# Patient Record
Sex: FEMALE | Race: ASIAN | NOT HISPANIC OR LATINO | URBAN - METROPOLITAN AREA
[De-identification: names, ages, dates, MRNs, and addresses within clinical notes are randomized per-mention and may not be internally consistent; named-entity substitution may affect disease eponyms.]

---

## 2018-03-29 ENCOUNTER — COMMUNICATION - HEALTHEAST (OUTPATIENT)
Dept: FAMILY MEDICINE | Facility: CLINIC | Age: 68
End: 2018-03-29

## 2018-03-29 ENCOUNTER — OFFICE VISIT - HEALTHEAST (OUTPATIENT)
Dept: FAMILY MEDICINE | Facility: CLINIC | Age: 68
End: 2018-03-29

## 2018-03-29 DIAGNOSIS — E03.9 HYPOTHYROIDISM: ICD-10-CM

## 2018-03-29 DIAGNOSIS — I10 ESSENTIAL HYPERTENSION: ICD-10-CM

## 2018-03-29 DIAGNOSIS — K91.2 POSTSURGICAL MALABSORPTION, NOT ELSEWHERE CLASSIFIED (CODE): ICD-10-CM

## 2018-03-29 DIAGNOSIS — Z12.11 SCREEN FOR COLON CANCER: ICD-10-CM

## 2018-03-29 DIAGNOSIS — H60.91 RIGHT OTITIS EXTERNA: ICD-10-CM

## 2018-03-29 DIAGNOSIS — Z12.31 VISIT FOR SCREENING MAMMOGRAM: ICD-10-CM

## 2018-03-29 LAB
ALBUMIN SERPL-MCNC: 3.7 G/DL (ref 3.5–5)
ALBUMIN UR-MCNC: NEGATIVE MG/DL
ALP SERPL-CCNC: 86 U/L (ref 45–120)
ALT SERPL W P-5'-P-CCNC: 47 U/L (ref 0–45)
ANION GAP SERPL CALCULATED.3IONS-SCNC: 9 MMOL/L (ref 5–18)
APPEARANCE UR: CLEAR
AST SERPL W P-5'-P-CCNC: 34 U/L (ref 0–40)
BACTERIA #/AREA URNS HPF: ABNORMAL HPF
BILIRUB SERPL-MCNC: 0.6 MG/DL (ref 0–1)
BILIRUB UR QL STRIP: NEGATIVE
BUN SERPL-MCNC: 14 MG/DL (ref 8–22)
CALCIUM SERPL-MCNC: 9.4 MG/DL (ref 8.5–10.5)
CHLORIDE BLD-SCNC: 102 MMOL/L (ref 98–107)
CHOLEST SERPL-MCNC: 249 MG/DL
CO2 SERPL-SCNC: 30 MMOL/L (ref 22–31)
COLOR UR AUTO: YELLOW
CREAT SERPL-MCNC: 0.81 MG/DL (ref 0.6–1.1)
CREAT UR-MCNC: 42.5 MG/DL
FASTING STATUS PATIENT QL REPORTED: YES
GFR SERPL CREATININE-BSD FRML MDRD: >60 ML/MIN/1.73M2
GLUCOSE BLD-MCNC: 101 MG/DL (ref 70–125)
GLUCOSE UR STRIP-MCNC: NEGATIVE MG/DL
HBA1C MFR BLD: 5.8 % (ref 3.5–6)
HDLC SERPL-MCNC: 66 MG/DL
HGB UR QL STRIP: ABNORMAL
KETONES UR STRIP-MCNC: NEGATIVE MG/DL
LDLC SERPL CALC-MCNC: 168 MG/DL
LEUKOCYTE ESTERASE UR QL STRIP: NEGATIVE
MICROALBUMIN UR-MCNC: 0.55 MG/DL (ref 0–1.99)
MICROALBUMIN/CREAT UR: 12.9 MG/G
NITRATE UR QL: NEGATIVE
PH UR STRIP: 7 [PH] (ref 5–8)
POTASSIUM BLD-SCNC: 4.5 MMOL/L (ref 3.5–5)
PROT SERPL-MCNC: 7.2 G/DL (ref 6–8)
RBC #/AREA URNS AUTO: ABNORMAL HPF
SODIUM SERPL-SCNC: 141 MMOL/L (ref 136–145)
SP GR UR STRIP: 1.01 (ref 1–1.03)
SQUAMOUS #/AREA URNS AUTO: ABNORMAL LPF
T3 SERPL-MCNC: 77 NG/DL (ref 45–175)
T4 FREE SERPL-MCNC: 0.9 NG/DL (ref 0.7–1.8)
THYROID PEROXIDASE ANTIBODIES - HISTORICAL: <3 IU/ML (ref 0–5.6)
TRIGL SERPL-MCNC: 76 MG/DL
TSH SERPL DL<=0.005 MIU/L-ACNC: 5.12 UIU/ML (ref 0.3–5)
UROBILINOGEN UR STRIP-ACNC: ABNORMAL
WBC #/AREA URNS AUTO: ABNORMAL HPF

## 2018-03-29 ASSESSMENT — MIFFLIN-ST. JEOR: SCORE: 1172.25

## 2018-04-02 ENCOUNTER — COMMUNICATION - HEALTHEAST (OUTPATIENT)
Dept: FAMILY MEDICINE | Facility: CLINIC | Age: 68
End: 2018-04-02

## 2018-04-02 DIAGNOSIS — E03.9 HYPOTHYROIDISM: ICD-10-CM

## 2018-04-02 DIAGNOSIS — H60.91 RIGHT OTITIS EXTERNA: ICD-10-CM

## 2018-04-02 DIAGNOSIS — I10 ESSENTIAL HYPERTENSION: ICD-10-CM

## 2018-04-04 ENCOUNTER — COMMUNICATION - HEALTHEAST (OUTPATIENT)
Dept: FAMILY MEDICINE | Facility: CLINIC | Age: 68
End: 2018-04-04

## 2018-04-13 ENCOUNTER — COMMUNICATION - HEALTHEAST (OUTPATIENT)
Dept: FAMILY MEDICINE | Facility: CLINIC | Age: 68
End: 2018-04-13

## 2018-10-15 ENCOUNTER — OFFICE VISIT - HEALTHEAST (OUTPATIENT)
Dept: FAMILY MEDICINE | Facility: CLINIC | Age: 68
End: 2018-10-15

## 2018-10-15 ENCOUNTER — RECORDS - HEALTHEAST (OUTPATIENT)
Dept: GENERAL RADIOLOGY | Facility: CLINIC | Age: 68
End: 2018-10-15

## 2018-10-15 ENCOUNTER — COMMUNICATION - HEALTHEAST (OUTPATIENT)
Dept: FAMILY MEDICINE | Facility: CLINIC | Age: 68
End: 2018-10-15

## 2018-10-15 DIAGNOSIS — Z13.228 SCREENING FOR ENDOCRINE, NUTRITIONAL, METABOLIC AND IMMUNITY DISORDER: ICD-10-CM

## 2018-10-15 DIAGNOSIS — M19.011 DJD OF RIGHT SHOULDER: ICD-10-CM

## 2018-10-15 DIAGNOSIS — I10 ESSENTIAL HYPERTENSION: ICD-10-CM

## 2018-10-15 DIAGNOSIS — Z00.01 ENCOUNTER FOR GENERAL ADULT MEDICAL EXAMINATION WITH ABNORMAL FINDINGS: ICD-10-CM

## 2018-10-15 DIAGNOSIS — Z12.11 SCREEN FOR COLON CANCER: ICD-10-CM

## 2018-10-15 DIAGNOSIS — Z13.21 SCREENING FOR ENDOCRINE, NUTRITIONAL, METABOLIC AND IMMUNITY DISORDER: ICD-10-CM

## 2018-10-15 DIAGNOSIS — M25.511 PAIN IN RIGHT SHOULDER: ICD-10-CM

## 2018-10-15 DIAGNOSIS — E03.9 HYPOTHYROIDISM: ICD-10-CM

## 2018-10-15 DIAGNOSIS — M25.511 RIGHT SHOULDER PAIN: ICD-10-CM

## 2018-10-15 DIAGNOSIS — E66.3 OVERWEIGHT (BMI 25.0-29.9): ICD-10-CM

## 2018-10-15 DIAGNOSIS — Z13.29 SCREENING FOR ENDOCRINE, NUTRITIONAL, METABOLIC AND IMMUNITY DISORDER: ICD-10-CM

## 2018-10-15 DIAGNOSIS — Z13.0 SCREENING FOR ENDOCRINE, NUTRITIONAL, METABOLIC AND IMMUNITY DISORDER: ICD-10-CM

## 2018-10-15 DIAGNOSIS — R10.31 RIGHT GROIN PAIN: ICD-10-CM

## 2018-10-15 LAB
ALBUMIN SERPL-MCNC: 4 G/DL (ref 3.5–5)
ALBUMIN UR-MCNC: NEGATIVE MG/DL
ALP SERPL-CCNC: 75 U/L (ref 45–120)
ALT SERPL W P-5'-P-CCNC: 18 U/L (ref 0–45)
ANION GAP SERPL CALCULATED.3IONS-SCNC: 8 MMOL/L (ref 5–18)
APPEARANCE UR: CLEAR
AST SERPL W P-5'-P-CCNC: 19 U/L (ref 0–40)
BILIRUB SERPL-MCNC: 0.5 MG/DL (ref 0–1)
BILIRUB UR QL STRIP: NEGATIVE
BUN SERPL-MCNC: 15 MG/DL (ref 8–22)
CALCIUM SERPL-MCNC: 9.7 MG/DL (ref 8.5–10.5)
CHLORIDE BLD-SCNC: 102 MMOL/L (ref 98–107)
CHOLEST SERPL-MCNC: 239 MG/DL
CO2 SERPL-SCNC: 31 MMOL/L (ref 22–31)
COLOR UR AUTO: YELLOW
CREAT SERPL-MCNC: 0.75 MG/DL (ref 0.6–1.1)
FASTING STATUS PATIENT QL REPORTED: YES
GFR SERPL CREATININE-BSD FRML MDRD: >60 ML/MIN/1.73M2
GLUCOSE BLD-MCNC: 84 MG/DL (ref 70–125)
GLUCOSE UR STRIP-MCNC: NEGATIVE MG/DL
HBA1C MFR BLD: 6.1 % (ref 3.5–6)
HDLC SERPL-MCNC: 62 MG/DL
HGB UR QL STRIP: NEGATIVE
KETONES UR STRIP-MCNC: NEGATIVE MG/DL
LDLC SERPL CALC-MCNC: 149 MG/DL
LEUKOCYTE ESTERASE UR QL STRIP: NEGATIVE
NITRATE UR QL: NEGATIVE
PH UR STRIP: 7.5 [PH] (ref 5–8)
POTASSIUM BLD-SCNC: 4.4 MMOL/L (ref 3.5–5)
PROT SERPL-MCNC: 7.1 G/DL (ref 6–8)
SODIUM SERPL-SCNC: 141 MMOL/L (ref 136–145)
SP GR UR STRIP: 1.01 (ref 1–1.03)
T3 SERPL-MCNC: 72 NG/DL (ref 45–175)
T4 FREE SERPL-MCNC: 0.9 NG/DL (ref 0.7–1.8)
TRIGL SERPL-MCNC: 139 MG/DL
TSH SERPL DL<=0.005 MIU/L-ACNC: 2.24 UIU/ML (ref 0.3–5)
UROBILINOGEN UR STRIP-ACNC: NORMAL

## 2018-10-15 ASSESSMENT — MIFFLIN-ST. JEOR: SCORE: 1163.18

## 2018-11-08 ENCOUNTER — OFFICE VISIT - HEALTHEAST (OUTPATIENT)
Dept: PHYSICAL THERAPY | Facility: REHABILITATION | Age: 68
End: 2018-11-08

## 2018-11-08 ENCOUNTER — AMBULATORY - HEALTHEAST (OUTPATIENT)
Dept: FAMILY MEDICINE | Facility: CLINIC | Age: 68
End: 2018-11-08

## 2018-11-08 DIAGNOSIS — M25.511 RIGHT SHOULDER PAIN: ICD-10-CM

## 2018-11-08 DIAGNOSIS — M25.511 CHRONIC RIGHT SHOULDER PAIN: ICD-10-CM

## 2018-11-08 DIAGNOSIS — M25.611 DECREASED ROM OF RIGHT SHOULDER: ICD-10-CM

## 2018-11-08 DIAGNOSIS — M62.81 MUSCLE WEAKNESS (GENERALIZED): ICD-10-CM

## 2018-11-08 DIAGNOSIS — R29.3 POOR POSTURE: ICD-10-CM

## 2018-11-08 DIAGNOSIS — R10.31 GROIN PAIN, RIGHT: ICD-10-CM

## 2018-11-08 DIAGNOSIS — G89.29 CHRONIC RIGHT SHOULDER PAIN: ICD-10-CM

## 2018-11-13 ENCOUNTER — OFFICE VISIT - HEALTHEAST (OUTPATIENT)
Dept: PHYSICAL THERAPY | Facility: REHABILITATION | Age: 68
End: 2018-11-13

## 2018-11-13 DIAGNOSIS — R10.31 GROIN PAIN, RIGHT: ICD-10-CM

## 2018-11-13 DIAGNOSIS — R29.3 POOR POSTURE: ICD-10-CM

## 2018-11-13 DIAGNOSIS — G89.29 CHRONIC RIGHT SHOULDER PAIN: ICD-10-CM

## 2018-11-13 DIAGNOSIS — M25.611 DECREASED ROM OF RIGHT SHOULDER: ICD-10-CM

## 2018-11-13 DIAGNOSIS — M25.511 CHRONIC RIGHT SHOULDER PAIN: ICD-10-CM

## 2018-11-13 DIAGNOSIS — M62.81 MUSCLE WEAKNESS (GENERALIZED): ICD-10-CM

## 2018-12-12 ENCOUNTER — COMMUNICATION - HEALTHEAST (OUTPATIENT)
Dept: FAMILY MEDICINE | Facility: CLINIC | Age: 68
End: 2018-12-12

## 2018-12-12 DIAGNOSIS — E03.9 HYPOTHYROIDISM: ICD-10-CM

## 2018-12-12 DIAGNOSIS — I10 ESSENTIAL HYPERTENSION: ICD-10-CM

## 2019-08-30 ENCOUNTER — COMMUNICATION - HEALTHEAST (OUTPATIENT)
Dept: SCHEDULING | Facility: CLINIC | Age: 69
End: 2019-08-30

## 2019-09-03 ENCOUNTER — HOSPITAL ENCOUNTER (OUTPATIENT)
Dept: CARDIOLOGY | Facility: CLINIC | Age: 69
Discharge: HOME | End: 2019-09-03
Attending: INTERNAL MEDICINE

## 2019-09-03 DIAGNOSIS — I63.9 ACUTE CVA (CEREBROVASCULAR ACCIDENT) (H): ICD-10-CM

## 2019-09-17 ENCOUNTER — HOSPITAL ENCOUNTER (OUTPATIENT)
Dept: CARDIOLOGY | Facility: CLINIC | Age: 69
Discharge: HOME | End: 2019-09-17
Attending: INTERNAL MEDICINE

## 2019-10-23 ENCOUNTER — COMMUNICATION - HEALTHEAST (OUTPATIENT)
Dept: FAMILY MEDICINE | Facility: CLINIC | Age: 69
End: 2019-10-23

## 2019-10-30 ENCOUNTER — COMMUNICATION - HEALTHEAST (OUTPATIENT)
Dept: FAMILY MEDICINE | Facility: CLINIC | Age: 69
End: 2019-10-30

## 2020-01-04 ENCOUNTER — COMMUNICATION - HEALTHEAST (OUTPATIENT)
Dept: FAMILY MEDICINE | Facility: CLINIC | Age: 70
End: 2020-01-04

## 2020-01-04 DIAGNOSIS — I10 ESSENTIAL HYPERTENSION: ICD-10-CM

## 2020-01-04 DIAGNOSIS — E03.9 HYPOTHYROIDISM: ICD-10-CM

## 2020-01-07 ENCOUNTER — COMMUNICATION - HEALTHEAST (OUTPATIENT)
Dept: FAMILY MEDICINE | Facility: CLINIC | Age: 70
End: 2020-01-07

## 2020-01-07 DIAGNOSIS — I10 ESSENTIAL HYPERTENSION: ICD-10-CM

## 2020-01-21 ENCOUNTER — OFFICE VISIT - HEALTHEAST (OUTPATIENT)
Dept: FAMILY MEDICINE | Facility: CLINIC | Age: 70
End: 2020-01-21

## 2020-01-21 DIAGNOSIS — I69.354 HEMIPLEGIA OF LEFT NONDOMINANT SIDE AS LATE EFFECT OF CEREBRAL INFARCTION, UNSPECIFIED HEMIPLEGIA TYPE (H): ICD-10-CM

## 2020-01-21 DIAGNOSIS — Z12.11 SCREEN FOR COLON CANCER: ICD-10-CM

## 2020-01-21 DIAGNOSIS — E03.8 OTHER SPECIFIED HYPOTHYROIDISM: ICD-10-CM

## 2020-01-21 DIAGNOSIS — Z13.29 SCREENING FOR ENDOCRINE, NUTRITIONAL, METABOLIC AND IMMUNITY DISORDER: ICD-10-CM

## 2020-01-21 DIAGNOSIS — E03.9 HYPOTHYROIDISM, UNSPECIFIED TYPE: ICD-10-CM

## 2020-01-21 DIAGNOSIS — Z13.21 SCREENING FOR ENDOCRINE, NUTRITIONAL, METABOLIC AND IMMUNITY DISORDER: ICD-10-CM

## 2020-01-21 DIAGNOSIS — Z13.0 SCREENING FOR ENDOCRINE, NUTRITIONAL, METABOLIC AND IMMUNITY DISORDER: ICD-10-CM

## 2020-01-21 DIAGNOSIS — E78.2 MIXED HYPERLIPIDEMIA: ICD-10-CM

## 2020-01-21 DIAGNOSIS — Z12.31 VISIT FOR SCREENING MAMMOGRAM: ICD-10-CM

## 2020-01-21 DIAGNOSIS — R94.31 ABNORMAL HOLTER MONITOR FINDING: ICD-10-CM

## 2020-01-21 DIAGNOSIS — I10 ESSENTIAL HYPERTENSION: ICD-10-CM

## 2020-01-21 DIAGNOSIS — I63.9 ACUTE CVA (CEREBROVASCULAR ACCIDENT) (H): ICD-10-CM

## 2020-01-21 DIAGNOSIS — Z13.228 SCREENING FOR ENDOCRINE, NUTRITIONAL, METABOLIC AND IMMUNITY DISORDER: ICD-10-CM

## 2020-01-21 DIAGNOSIS — Z11.4 SCREENING FOR HIV WITHOUT PRESENCE OF RISK FACTORS: ICD-10-CM

## 2020-01-21 LAB
ALBUMIN SERPL-MCNC: 3.8 G/DL (ref 3.5–5)
ALP SERPL-CCNC: 72 U/L (ref 45–120)
ALT SERPL W P-5'-P-CCNC: 21 U/L (ref 0–45)
ANION GAP SERPL CALCULATED.3IONS-SCNC: 8 MMOL/L (ref 5–18)
AST SERPL W P-5'-P-CCNC: 21 U/L (ref 0–40)
BILIRUB SERPL-MCNC: 0.5 MG/DL (ref 0–1)
BUN SERPL-MCNC: 11 MG/DL (ref 8–22)
CALCIUM SERPL-MCNC: 8.9 MG/DL (ref 8.5–10.5)
CHLORIDE BLD-SCNC: 103 MMOL/L (ref 98–107)
CHOLEST SERPL-MCNC: 231 MG/DL
CO2 SERPL-SCNC: 31 MMOL/L (ref 22–31)
CREAT SERPL-MCNC: 0.79 MG/DL (ref 0.6–1.1)
CREAT UR-MCNC: 34.7 MG/DL
ERYTHROCYTE [DISTWIDTH] IN BLOOD BY AUTOMATED COUNT: 11.9 % (ref 11–14.5)
FASTING STATUS PATIENT QL REPORTED: ABNORMAL
GFR SERPL CREATININE-BSD FRML MDRD: >60 ML/MIN/1.73M2
GLUCOSE BLD-MCNC: 82 MG/DL (ref 70–125)
HBA1C MFR BLD: 6 % (ref 3.5–6)
HCT VFR BLD AUTO: 40.9 % (ref 35–47)
HDLC SERPL-MCNC: 57 MG/DL
HGB BLD-MCNC: 13.6 G/DL (ref 12–16)
LDLC SERPL CALC-MCNC: 140 MG/DL
MCH RBC QN AUTO: 30 PG (ref 27–34)
MCHC RBC AUTO-ENTMCNC: 33.2 G/DL (ref 32–36)
MCV RBC AUTO: 90 FL (ref 80–100)
MICROALBUMIN UR-MCNC: <0.5 MG/DL (ref 0–1.99)
MICROALBUMIN/CREAT UR: NORMAL MG/G{CREAT}
PLATELET # BLD AUTO: 254 THOU/UL (ref 140–440)
PMV BLD AUTO: 6.9 FL (ref 7–10)
POTASSIUM BLD-SCNC: 4 MMOL/L (ref 3.5–5)
PROT SERPL-MCNC: 6.9 G/DL (ref 6–8)
RBC # BLD AUTO: 4.52 MILL/UL (ref 3.8–5.4)
SODIUM SERPL-SCNC: 142 MMOL/L (ref 136–145)
THYROID PEROXIDASE ANTIBODIES - HISTORICAL: <3 IU/ML (ref 0–5.6)
TRIGL SERPL-MCNC: 169 MG/DL
TSH SERPL DL<=0.005 MIU/L-ACNC: 4.17 UIU/ML (ref 0.3–5)
WBC: 4.1 THOU/UL (ref 4–11)

## 2020-01-21 RX ORDER — ATORVASTATIN CALCIUM 40 MG/1
40 TABLET, FILM COATED ORAL DAILY
Qty: 30 TABLET | Refills: 0 | Status: SHIPPED | OUTPATIENT
Start: 2020-01-21

## 2020-01-21 ASSESSMENT — MIFFLIN-ST. JEOR: SCORE: 1179.2

## 2020-01-22 ENCOUNTER — COMMUNICATION - HEALTHEAST (OUTPATIENT)
Dept: FAMILY MEDICINE | Facility: CLINIC | Age: 70
End: 2020-01-22

## 2020-01-22 LAB — HCV AB SERPL QL IA: NEGATIVE

## 2020-02-11 ENCOUNTER — OFFICE VISIT - HEALTHEAST (OUTPATIENT)
Dept: CARDIOLOGY | Facility: CLINIC | Age: 70
End: 2020-02-11

## 2020-11-05 ENCOUNTER — COMMUNICATION - HEALTHEAST (OUTPATIENT)
Dept: SCHEDULING | Facility: CLINIC | Age: 70
End: 2020-11-05

## 2020-11-05 ENCOUNTER — COMMUNICATION - HEALTHEAST (OUTPATIENT)
Dept: FAMILY MEDICINE | Facility: CLINIC | Age: 70
End: 2020-11-05

## 2020-11-18 ENCOUNTER — OFFICE VISIT - HEALTHEAST (OUTPATIENT)
Dept: FAMILY MEDICINE | Facility: CLINIC | Age: 70
End: 2020-11-18

## 2020-11-18 DIAGNOSIS — Z12.11 SCREEN FOR COLON CANCER: ICD-10-CM

## 2020-11-18 DIAGNOSIS — R94.31 ABNORMAL HOLTER EXAM: ICD-10-CM

## 2020-11-18 DIAGNOSIS — Z11.4 SCREENING FOR HIV WITHOUT PRESENCE OF RISK FACTORS: ICD-10-CM

## 2020-11-18 DIAGNOSIS — R73.03 PREDIABETES: ICD-10-CM

## 2020-11-18 DIAGNOSIS — I10 ESSENTIAL HYPERTENSION: ICD-10-CM

## 2020-11-18 DIAGNOSIS — I63.9 ACUTE CVA (CEREBROVASCULAR ACCIDENT) (H): ICD-10-CM

## 2020-11-18 DIAGNOSIS — Z00.00 ROUTINE GENERAL MEDICAL EXAMINATION AT A HEALTH CARE FACILITY: ICD-10-CM

## 2020-11-18 DIAGNOSIS — E03.8 OTHER SPECIFIED HYPOTHYROIDISM: ICD-10-CM

## 2020-11-18 DIAGNOSIS — Z13.820 SCREENING FOR OSTEOPOROSIS: ICD-10-CM

## 2020-11-18 DIAGNOSIS — E78.2 MIXED HYPERLIPIDEMIA: ICD-10-CM

## 2020-11-18 LAB
ALBUMIN SERPL-MCNC: 4 G/DL (ref 3.5–5)
ALP SERPL-CCNC: 73 U/L (ref 45–120)
ALT SERPL W P-5'-P-CCNC: 21 U/L (ref 0–45)
ANION GAP SERPL CALCULATED.3IONS-SCNC: 11 MMOL/L (ref 5–18)
AST SERPL W P-5'-P-CCNC: 22 U/L (ref 0–40)
BILIRUB SERPL-MCNC: 0.2 MG/DL (ref 0–1)
BUN SERPL-MCNC: 20 MG/DL (ref 8–22)
CALCIUM SERPL-MCNC: 9.2 MG/DL (ref 8.5–10.5)
CHLORIDE BLD-SCNC: 102 MMOL/L (ref 98–107)
CHOLEST SERPL-MCNC: 226 MG/DL
CO2 SERPL-SCNC: 28 MMOL/L (ref 22–31)
CREAT SERPL-MCNC: 0.76 MG/DL (ref 0.6–1.1)
CREAT UR-MCNC: 44.3 MG/DL
FASTING STATUS PATIENT QL REPORTED: NO
GFR SERPL CREATININE-BSD FRML MDRD: >60 ML/MIN/1.73M2
GLUCOSE BLD-MCNC: 94 MG/DL (ref 70–125)
HBA1C MFR BLD: 5.7 %
HDLC SERPL-MCNC: 51 MG/DL
HIV 1+2 AB+HIV1 P24 AG SERPL QL IA: NEGATIVE
LDLC SERPL CALC-MCNC: 124 MG/DL
MICROALBUMIN UR-MCNC: <0.5 MG/DL (ref 0–1.99)
MICROALBUMIN/CREAT UR: NORMAL MG/G{CREAT}
POTASSIUM BLD-SCNC: 3.9 MMOL/L (ref 3.5–5)
PROT SERPL-MCNC: 7.2 G/DL (ref 6–8)
SODIUM SERPL-SCNC: 141 MMOL/L (ref 136–145)
TRIGL SERPL-MCNC: 257 MG/DL
TSH SERPL DL<=0.005 MIU/L-ACNC: 1.98 UIU/ML (ref 0.3–5)

## 2020-11-18 ASSESSMENT — MIFFLIN-ST. JEOR: SCORE: 1184.58

## 2020-11-20 ENCOUNTER — COMMUNICATION - HEALTHEAST (OUTPATIENT)
Dept: FAMILY MEDICINE | Facility: CLINIC | Age: 70
End: 2020-11-20

## 2021-01-21 ENCOUNTER — COMMUNICATION - HEALTHEAST (OUTPATIENT)
Dept: FAMILY MEDICINE | Facility: CLINIC | Age: 71
End: 2021-01-21

## 2021-01-21 DIAGNOSIS — E03.8 OTHER SPECIFIED HYPOTHYROIDISM: ICD-10-CM

## 2021-01-21 RX ORDER — LEVOTHYROXINE SODIUM 25 UG/1
TABLET ORAL
Qty: 90 TABLET | Refills: 3 | Status: SHIPPED | OUTPATIENT
Start: 2021-01-21 | End: 2022-01-25

## 2021-01-23 ENCOUNTER — COMMUNICATION - HEALTHEAST (OUTPATIENT)
Dept: FAMILY MEDICINE | Facility: CLINIC | Age: 71
End: 2021-01-23

## 2021-01-23 DIAGNOSIS — I10 ESSENTIAL HYPERTENSION: ICD-10-CM

## 2021-01-24 RX ORDER — HYDROCHLOROTHIAZIDE 25 MG/1
25 TABLET ORAL DAILY
Qty: 90 TABLET | Refills: 2 | Status: SHIPPED | OUTPATIENT
Start: 2021-01-24 | End: 2021-10-21

## 2021-05-31 NOTE — TELEPHONE ENCOUNTER
RN triage   Attempted to reach pt again w/    No answer   LM to call HE triage back   Eugenia Reynolds RN [facility] RN triage

## 2021-05-31 NOTE — TELEPHONE ENCOUNTER
RN triage   Call from person stating she is  --   Not with pt -- pt is not on the phone line - initially   did not know pt phone number  --  difficult to understand and not with [facility] contracted  service  Told caller that I will call pt - verified language = mandarin chinese  With HE  service - mandarin  -- called pt -- no answer -- LM to call HE back   Eugenia Reynolds RN [facility] RN triage

## 2021-06-01 VITALS — WEIGHT: 153 LBS | BODY MASS INDEX: 28.16 KG/M2 | HEIGHT: 62 IN

## 2021-06-02 ENCOUNTER — RECORDS - HEALTHEAST (OUTPATIENT)
Dept: ADMINISTRATIVE | Facility: CLINIC | Age: 71
End: 2021-06-02

## 2021-06-02 VITALS — WEIGHT: 151 LBS | BODY MASS INDEX: 27.79 KG/M2 | HEIGHT: 62 IN

## 2021-06-02 NOTE — TELEPHONE ENCOUNTER
----- Message from Carlee Hull MD sent at 10/23/2019 11:04 AM CDT -----  Recent Holter monitor testing did show some abnormalities, I would like you to follow with our cardiologist, please call the clinic to schedule an appointment .

## 2021-06-02 NOTE — TELEPHONE ENCOUNTER
----- Message from aCrlee Hull MD sent at 10/23/2019 11:04 AM CDT -----  Recent Holter monitor testing did show some abnormalities, I would like you to follow with our cardiologist, please call the clinic to schedule an appointment .

## 2021-06-02 NOTE — TELEPHONE ENCOUNTER
Left message to call back for: Patient-through  Services  Information to relay to patient:  Please give results to patient per MD. If patient would like to see cardiologist, please route to Dr. Hull.

## 2021-06-04 VITALS
OXYGEN SATURATION: 99 % | BODY MASS INDEX: 26.89 KG/M2 | HEIGHT: 63 IN | SYSTOLIC BLOOD PRESSURE: 150 MMHG | WEIGHT: 151.75 LBS | DIASTOLIC BLOOD PRESSURE: 85 MMHG | HEART RATE: 60 BPM

## 2021-06-04 NOTE — TELEPHONE ENCOUNTER
RN cannot approve Refill Request    RN can NOT refill this medication PCP messaged that patient is overdue for Labs and Office Visit. Last office visit: 3/29/2018 Carlee Hull MD Last Physical: 10/15/2018 Last MTM visit: Visit date not found Last visit same specialty: 3/29/2018 Carlee Hull MD.  Next visit within 3 mo: Visit date not found  Next physical within 3 mo: Visit date not found      Susan Meneses, [facility] Triage/Med Refill 1/5/2020    Requested Prescriptions   Pending Prescriptions Disp Refills     hydroCHLOROthiazide (HYDRODIURIL) 25 MG tablet [Pharmacy Med Name: HYDROCHLOROTHIAZIDE 25MG TABLETS] 90 tablet 3     Sig: TAKE 1 TABLET BY MOUTH DAILY       Diuretics/Combination Diuretics Refill Protocol  Failed - 1/4/2020  9:03 AM        Failed - Visit with PCP or prescribing provider visit in past 12 months     Last office visit with prescriber/PCP: 3/29/2018 Carlee Hull MD OR same dept: Visit date not found OR same specialty: 3/29/2018 Carlee Hull MD  Last physical: 10/15/2018 Last MTM visit: Visit date not found   Next visit within 3 mo: Visit date not found  Next physical within 3 mo: Visit date not found  Prescriber OR PCP: Carlee Hull MD  Last diagnosis associated with med order: 1. Essential hypertension  - hydroCHLOROthiazide (HYDRODIURIL) 25 MG tablet [Pharmacy Med Name: HYDROCHLOROTHIAZIDE 25MG TABLETS]; TAKE 1 TABLET BY MOUTH DAILY  Dispense: 90 tablet; Refill: 3    2. Hypothyroidism  - levothyroxine (SYNTHROID, LEVOTHROID) 25 MCG tablet [Pharmacy Med Name: LEVOTHYROXINE 0.025MG (25MCG) TAB]; TAKE 1 TABLET BY MOUTH DAILY AT 6AM  Dispense: 90 tablet; Refill: 3    If protocol passes may refill for 12 months if within 3 months of last provider visit (or a total of 15 months).             Passed - Serum Potassium in past 12 months      Lab Results   Component Value Date    Potassium 3.6 08/30/2019             Passed - Serum  Sodium in past 12 months      Lab Results   Component Value Date    Sodium 141 08/30/2019             Passed - Blood pressure on file in past 12 months     BP Readings from Last 1 Encounters:   09/01/19 (!) 144/92             Passed - Serum Creatinine in past 12 months      Creatinine   Date Value Ref Range Status   08/30/2019 0.81 0.60 - 1.10 mg/dL Final             levothyroxine (SYNTHROID, LEVOTHROID) 25 MCG tablet [Pharmacy Med Name: LEVOTHYROXINE 0.025MG (25MCG) TAB] 90 tablet 3     Sig: TAKE 1 TABLET BY MOUTH DAILY AT 6AM       Thyroid Hormones Protocol Failed - 1/4/2020  9:03 AM        Failed - Provider visit in past 12 months or next 3 months     Last office visit with prescriber/PCP: 3/29/2018 Carlee Hull MD OR same dept: Visit date not found OR same specialty: 3/29/2018 Carlee Hull MD  Last physical: 10/15/2018 Last MTM visit: Visit date not found   Next visit within 3 mo: Visit date not found  Next physical within 3 mo: Visit date not found  Prescriber OR PCP: Carlee Hull MD  Last diagnosis associated with med order: 1. Essential hypertension  - hydroCHLOROthiazide (HYDRODIURIL) 25 MG tablet [Pharmacy Med Name: HYDROCHLOROTHIAZIDE 25MG TABLETS]; TAKE 1 TABLET BY MOUTH DAILY  Dispense: 90 tablet; Refill: 3    2. Hypothyroidism  - levothyroxine (SYNTHROID, LEVOTHROID) 25 MCG tablet [Pharmacy Med Name: LEVOTHYROXINE 0.025MG (25MCG) TAB]; TAKE 1 TABLET BY MOUTH DAILY AT 6AM  Dispense: 90 tablet; Refill: 3    If protocol passes may refill for 12 months if within 3 months of last provider visit (or a total of 15 months).             Passed - TSH on file in past 12 months for patient age 12 & older     TSH   Date Value Ref Range Status   08/30/2019 2.37 0.30 - 5.00 uIU/mL Final

## 2021-06-05 VITALS
SYSTOLIC BLOOD PRESSURE: 133 MMHG | RESPIRATION RATE: 17 BRPM | WEIGHT: 155 LBS | DIASTOLIC BLOOD PRESSURE: 69 MMHG | HEART RATE: 69 BPM | HEIGHT: 62 IN | TEMPERATURE: 97.2 F | BODY MASS INDEX: 28.52 KG/M2

## 2021-06-05 NOTE — TELEPHONE ENCOUNTER
LMTCB . Please assist patient in scheduling an appointment when the patient returns the call. Thank you .  NOTE: PLEASE CLOSE THE ENCOUNTER WHEN PATIENT IS SCHEDULED.

## 2021-06-05 NOTE — PROGRESS NOTES
OFFICE VISIT - FAMILY MEDICINE     ASSESSMENT AND PLAN     1. Essential hypertension  hydroCHLOROthiazide (HYDRODIURIL) 25 MG tablet    Comprehensive Metabolic Panel    Microalbumin, Random Urine   2. Other specified hypothyroidism  levothyroxine (SYNTHROID, LEVOTHROID) 25 MCG tablet   3. Right zhou infarction  atorvastatin (LIPITOR) 40 MG tablet   4. Abnormal Holter monitor finding  Ambulatory referral to Cardiology   5. Screening for endocrine, nutritional, metabolic and immunity disorder  Glycosylated Hemoglobin A1c    HM2(CBC w/o Differential)   6. Mixed hyperlipidemia  Lipid Cascade   7. Hypothyroidism, unspecified type  Thyroid Cascade    Thyroid Peroxidase Antibody   8. Screening for HIV without presence of risk factors  Hepatitis C Antibody (Anti-HCV)   9. 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 Screen for colon cancer  Cologuard    Cologuard   11. Visit for screening mammogram  Mammo Screening Bilateral   Uncontrolled hypertension, resume hydrochlorothiazide, monitor blood pressure 2-3 times a week, return for recheck at the clinic in about 4 to 6 weeks.  Abnormal Holter monitor with possible atrial fibrillation done to look for etiology of right pontine infarct referral to cardiologist, continue aggressive with secondary prevention of stroke, tight control of blood sugar, cholesterol.  Healthcare maintenance and screening colon cancer with risk and benefit discussed, Cologuard order was placed.  History of stomach ulcer in [place], awaiting medical record, consider referral to GI to discuss EGD; risk of GI bleeding discussed with aspirin and clopidogrel.  She is taking a medication from [place] probably a PPI ,she  will call us with the name, otherwise could  prescribe a PPI to minimize risk of bleeding.    CHIEF COMPLAINT   Follow-up (medication for blood pressure)    HAL Anand is a 69 y.o. female.  No Patient Care  "Coordination Note on file.  She was asked to follow-up with an office visit in order to get her medication refill, last office visit was October 2018, in the interim patient was admitted at [facility] for a right pontine stroke, she was discharged from the hospital with aspirin, Lipitor, clopidogrel  She stating that she only taking aspirin every other day.  She is also stating that in the interim she did travel to [facility] and she had a \"stomach ulcer\" over there, no documentation available for review.  She is taking the medication for stomach she is not aware of the name.  She was asked to call with the name of the medication and to bring that one medical record for review.  She also has an abnormal Holter monitor with possible atrial fibrillation, several call were made  to the patient, but she did not follow-up on that again because she stated that she was in [place].  She is not taking her Lipitor and HCTZ.    Review of Systems As per HPI, otherwise negative.    OBJECTIVE   /85 (Patient Site: Right Arm, Patient Position: Sitting, Cuff Size: Adult Regular)   Pulse 60   Ht 5' 2.8\" (1.595 m)   Wt 151 lb 12 oz (68.8 kg)   SpO2 99%   BMI 27.06 kg/m    Physical Exam   Constitutional: She is oriented to person, place, and time. She appears well-developed and well-nourished.   HENT:   Head: Normocephalic and atraumatic.   Neck: Normal range of motion. Neck supple. No JVD present. No tracheal deviation present. No thyromegaly present.   Cardiovascular: Normal rate, regular rhythm, normal heart sounds and intact distal pulses. Exam reveals no gallop and no friction rub.   No murmur heard.  Pulmonary/Chest: Effort normal and breath sounds normal. No respiratory distress. She has no wheezes. She has no rales.   Musculoskeletal:         General: No tenderness or edema.   Lymphadenopathy:     She has no cervical adenopathy.   Neurological: She is alert and oriented to person, place, and time. Coordination " normal.   Psychiatric: She has a normal mood and affect. Judgment and thought content normal.       [facility]   No family history on file.  Social History     Socioeconomic History     Marital status:      Spouse name: Not on file     Number of children: Not on file     Years of education: Not on file     Highest education level: Not on file   Occupational History     Not on file   Social Needs     Financial resource strain: Not on file     Food insecurity:     Worry: Not on file     Inability: Not on file     Transportation needs:     Medical: Not on file     Non-medical: Not on file   Tobacco Use     Smoking status: Never Smoker     Smokeless tobacco: Never Used   Substance and Sexual Activity     Alcohol use: No     Drug use: No     Sexual activity: Not on file   Lifestyle     Physical activity:     Days per week: Not on file     Minutes per session: Not on file     Stress: Not on file   Relationships     Social connections:     Talks on phone: Not on file     Gets together: Not on file     Attends Yazidism service: Not on file     Active member of club or organization: Not on file     Attends meetings of clubs or organizations: Not on file     Relationship status: Not on file     Intimate partner violence:     Fear of current or ex partner: Not on file     Emotionally abused: Not on file     Physically abused: Not on file     Forced sexual activity: Not on file   Other Topics Concern     Not on file   Social History Narrative     Not on file     Relevant history was reviewed with the patient today, unless noted in HPI, nothing is pertinent for this visit.  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, unspecified hemiplegia type (H) 01/21/2020     Right zhou infarction 08/30/2019     DJD of right shoulder 10/15/2018     Essential hypertension 10/20/2015     Hypothyroidism      Overview Note:     Created by  Conversion  [facility] Annotation: Nov 7 2010  8:39PM - Macrina Herrera: borderline    Replacement Utility updated for latest IMO load       Vitamin D Deficiency      Overview Note:     Created by Conversion  [facility] Annotation: Nov 7 2010  8:39PM - Macrina Herrera: severe    Replacement Utility updated for latest IMO load       Cerumen Impaction      Overview Note:     Created by Conversion         Rhythm Disorder      Overview Note:     Created by Conversion    Replacement Utility updated for latest IMO load       Abnormal Heart Sounds      Overview Note:     Created by Conversion         No past surgical history on file.    RESULTS/CONSULTS (Lab/Rad)     Recent Results (from the past 168 hour(s))   Glycosylated Hemoglobin A1c   Result Value Ref Range    Hemoglobin A1c 6.0 3.5 - 6.0 %   Comprehensive Metabolic Panel   Result Value Ref Range    Sodium 142 136 - 145 mmol/L    Potassium 4.0 3.5 - 5.0 mmol/L    Chloride 103 98 - 107 mmol/L    CO2 31 22 - 31 mmol/L    Anion Gap, Calculation 8 5 - 18 mmol/L    Glucose 82 70 - 125 mg/dL    BUN 11 8 - 22 mg/dL    Creatinine 0.79 0.60 - 1.10 mg/dL    GFR MDRD Af Amer >60 >60 mL/min/1.73m2    GFR MDRD Non Af Amer >60 >60 mL/min/1.73m2    Bilirubin, Total 0.5 0.0 - 1.0 mg/dL    Calcium 8.9 8.5 - 10.5 mg/dL    Protein, Total 6.9 6.0 - 8.0 g/dL    Albumin 3.8 3.5 - 5.0 g/dL    Alkaline Phosphatase 72 45 - 120 U/L    AST 21 0 - 40 U/L    ALT 21 0 - 45 U/L   Lipid Cascade   Result Value Ref Range    Cholesterol 231 (H) <=199 mg/dL    Triglycerides 169 (H) <=149 mg/dL    HDL Cholesterol 57 >=50 mg/dL    LDL Calculated 140 (H) <=129 mg/dL    Patient Fasting > 8hrs? Unknown    Thyroid Cascade   Result Value Ref Range    TSH 4.17 0.30 - 5.00 uIU/mL   Hepatitis C Antibody (Anti-HCV)   Result Value Ref Range    Hepatitis C Ab Negative Negative   HM2(CBC w/o Differential)   Result Value Ref Range    WBC 4.1 4.0 - 11.0 thou/uL    RBC 4.52 3.80 - 5.40 mill/uL    Hemoglobin 13.6  12.0 - 16.0 g/dL    Hematocrit 40.9 35.0 - 47.0 %    MCV 90 80 - 100 fL    MCH 30.0 27.0 - 34.0 pg    MCHC 33.2 32.0 - 36.0 g/dL    RDW 11.9 11.0 - 14.5 %    Platelets 254 140 - 440 thou/uL    MPV 6.9 (L) 7.0 - 10.0 fL   Thyroid Peroxidase Antibody   Result Value Ref Range    Thyroid Peroxidase Ab <3.0 0.0 - 5.6 IU/mL   Microalbumin, Random Urine   Result Value Ref Range    Microalbumin, Random Urine <0.50 0.00 - 1.99 mg/dL    Creatinine, Urine 34.7 mg/dL    Microalbumin/Creatinine Ratio Random Urine       No results found.  MEDICATIONS     Current Outpatient Medications on File Prior to Visit   Medication Sig Dispense Refill     aspirin 81 MG EC tablet Take 1 tablet (81 mg total) by mouth daily. (Patient taking differently: Take 81 mg by mouth every other day. )  0     cholecalciferol, vitamin D3, 1,000 unit tablet Take 1,000 Units by mouth daily.       UNABLE TO FIND Med Name: Root herbal (powder)       clopidogrel (PLAVIX) 75 mg tablet Take 1 tablet (75 mg total) by mouth daily for 20 days. 20 tablet 0     No current facility-administered medications on file prior to visit.        HEALTH MAINTENANCE / SCREENING   PHQ-2 Total Score: 0 (1/21/2020  8:50 AM)  , No data recorded,No data recorded  Immunization History   Administered Date(s) Administered     DT (pediatric) 01/01/1997     Influenza, inj, historic,unspecified 11/02/2010, 09/13/2012     Influenza,seasonal, Inj IIV3 11/02/2010, 09/13/2012     Pneumo Conj 13-V (2010&after) 10/15/2018     Tdap 08/25/2010     Health Maintenance   Topic     ZOSTER VACCINES (1 of 2)     COLOGUARD      DXA SCAN      MAMMOGRAM      INFLUENZA VACCINE RULE BASED (1)     MEDICARE ANNUAL WELLNESS VISIT      PNEUMOCOCCAL IMMUNIZATION 65+ LOW/MEDIUM RISK (2 of 2 - PPSV23)     TD 18+ HE      FALL RISK ASSESSMENT      ADVANCE CARE PLANNING      LIPID      HEPATITIS C SCREENING      The following high BMI interventions were performed this visit: encouragement to exercise  Carlee LANCE  MD Della  Family Medicine, [facility]     This note was dictated using a voice recognition software.  Any grammatical or context distortion are unintentional and inherent to the software.

## 2021-06-05 NOTE — TELEPHONE ENCOUNTER
RN cannot approve Refill Request    RN can NOT refill this medication Protocol failed and NO refill given. Last office visit: 3/29/2018 Carlee Hull MD Last Physical: 10/15/2018 Last MTM visit: Visit date not found Last visit same specialty: 3/29/2018 Carlee Hull MD.  Next visit within 3 mo: Visit date not found  Next physical within 3 mo: Visit date not found      Sara Olivera, [facility] Triage/Med Refill 1/8/2020    Requested Prescriptions   Pending Prescriptions Disp Refills     hydroCHLOROthiazide (HYDRODIURIL) 25 MG tablet [Pharmacy Med Name: HYDROCHLOROTHIAZIDE 25MG TABLETS] 90 tablet 3     Sig: TAKE 1 TABLET BY MOUTH DAILY       Diuretics/Combination Diuretics Refill Protocol  Failed - 1/7/2020  9:02 AM        Failed - Visit with PCP or prescribing provider visit in past 12 months     Last office visit with prescriber/PCP: 3/29/2018 Carlee Hull MD OR same dept: Visit date not found OR same specialty: 3/29/2018 Carlee Hull MD  Last physical: 10/15/2018 Last MTM visit: Visit date not found   Next visit within 3 mo: Visit date not found  Next physical within 3 mo: Visit date not found  Prescriber OR PCP: Carlee Hull MD  Last diagnosis associated with med order: 1. Essential hypertension  - hydroCHLOROthiazide (HYDRODIURIL) 25 MG tablet [Pharmacy Med Name: HYDROCHLOROTHIAZIDE 25MG TABLETS]; TAKE 1 TABLET BY MOUTH DAILY  Dispense: 90 tablet; Refill: 3    If protocol passes may refill for 12 months if within 3 months of last provider visit (or a total of 15 months).             Passed - Serum Potassium in past 12 months      Lab Results   Component Value Date    Potassium 3.6 08/30/2019             Passed - Serum Sodium in past 12 months      Lab Results   Component Value Date    Sodium 141 08/30/2019             Passed - Blood pressure on file in past 12 months     BP Readings from Last 1 Encounters:   09/01/19 (!) 144/92             Passed  - Serum Creatinine in past 12 months      Creatinine   Date Value Ref Range Status   08/30/2019 0.81 0.60 - 1.10 mg/dL Final

## 2021-06-12 NOTE — TELEPHONE ENCOUNTER
162/89 p 67    162/90 was the other reading    She took these readings around 9am    She uses an automatic arm band BP monitor    The last 3 days she hasn't missed any medications but before that she may have missed some doses of her medication.    No headache    No chest pain    No blurred vision    No breathing issues    No walking issues    No weakness    Per protocol patient should follow up in the next two weeks    COVID 19 Nurse Triage Plan/Patient Instructions    Please be aware that novel coronavirus (COVID-19) may be circulating in the community. If you develop symptoms such as fever, cough, or SOB or if you have concerns about the presence of another infection including coronavirus (COVID-19), please contact your health care provider or visit [email or web address].     Disposition/Instructions    In-Person Visit with provider recommended. Reference Visit Selection Guide.    Thank you for taking steps to prevent the spread of this virus.  o Limit your contact with others.  o Wear a simple mask to cover your cough.  o Wash your hands well and often.    Resources    [facility]: About COVID-19: [email or web address]    CDC: What to Do If You're Sick: www.cdc.gov/coronavirus/2019-ncov/about/steps-when-sick.html    CDC: Ending Home Isolation: www.cdc.gov/coronavirus/2019-ncov/hcp/disposition-in-home-patients.html     CDC: Caring for Someone: www.cdc.gov/coronavirus/2019-ncov/if-you-are-sick/care-for-someone.html     [facility]: Interim Guidance for Hospital Discharge to Home: www.health.[facility].[place]/diseases/coronavirus/hcp/hospdischarge.pdf    [facility] clinical trials (COVID-19 research studies): clinicalaffairs.[facility]/umn-clinical-trials     Below are the COVID-19 hotlines at the [place] Department of [facility] ([facility]). Interpreters are available.   o For health questions: Call 226-963-6472 or 1-665.187.6818 (7 a.m. to 7 p.m.)  o For questions about schools and childcare: Call 292-057-5264 or  9-642-418-6002 (7 a.m. to 7 p.m.)         Reason for Disposition    Systolic BP >= 130 OR Diastolic >= 80, and is taking BP medications    Additional Information    Negative: Sounds like a life-threatening emergency to the triager    Negative: Systolic BP >= 160 OR Diastolic >= 100, and any cardiac or neurologic symptoms (e.g., chest pain, difficulty breathing, unsteady gait, blurred vision)    Negative: Patient sounds very sick or weak to the triager    Negative: BP Systolic BP >= 140 OR Diastolic >= 90 and postpartum (from 0 to 6 weeks after delivery)    Negative: Systolic BP >= 180 OR Diastolic >= 110, and missed most recent dose of blood pressure medication    Negative: Systolic BP >= 180 OR Diastolic >= 110    Negative: Patient wants to be seen    Negative: Ran out of BP medications    Negative: Taking BP medications and feels is having side effects (e.g., impotence, cough, dizziness)    Negative: Systolic BP >= 160 OR Diastolic >= 100    Negative: Systolic BP >= 130 OR Diastolic >= 80, and pregnant    Protocols used: HIGH BLOOD PRESSURE-A-OH

## 2021-06-12 NOTE — TELEPHONE ENCOUNTER
FYI - Status Update  Who is Calling: Patient  Update: Patient is calling to check My- Chart message. Patient stated she forgot the password and would like to know what the My-Chart message says. Patient was informed of My-Chart message and has no further questions or concerns at this time.  Okay to leave a detailed message?:  No return call needed     FYI: Chinese, Mandarin , Mary ID #[id number] used during phone call.

## 2021-06-13 NOTE — PROGRESS NOTES
Assessment and Plan:     Patient has been advised of split billing requirements and indicates understanding: No  1. Routine general medical examination at a health care facility      2. Other specified hypothyroidism  She does have a history of hypo-, but has not been taking medication for quite some time.  Check thyroid panel.  - Thyroid Cascade    3. Essential hypertension  Currently controlled with HCTZ 25 mg daily, appears stable today.  Continue to monitor.  - Microalbumin, Random Urine    4. Right zhou infarction  Continue aggressive risk factor modification including tight control of blood pressure, prediabetes, hyperlipidemia.    5. Prediabetes  Current healthy lifestyle changes, regular physical activity.  - Glycosylated Hemoglobin A1c  - Comprehensive Metabolic Panel    6. Screening for HIV without presence of risk factors  - HIV Antigen/Antibody Screening Cascade    7. Mixed hyperlipidemia  - Lipid Cascade    8. Screening for osteoporosis  - DXA Bone Density Scan; Future    9. Abnormal Holter exam  Was referred to see cardiologist last year for atrial fibrillation noted on Holter monitor, but did not follow-up, new referral was placed hopefully she will follow this time.  - Ambulatory referral to Cardiology    10. Screen for colon cancer  She stating that she had a negative colonoscopy in [place] last year, she promised to bring records written in Chinese, hopefully Chinese , will help translate.    The patient's current medical problems were reviewed.    I have had an Advance Directives discussion with the patient.  The following are part of a depression follow up plan for the patient:  coping support management, emotional support assessment, emotional support education and emotional support management  The following high BMI interventions were performed this visit: encouragement to exercise and weight loss from baseline weight  The following health maintenance schedule was reviewed with the  patient and provided in printed form in the after visit summary:   Health Maintenance   Topic Date Due     ZOSTER VACCINES (1 of 2) 07/28/2001     DXA SCAN  07/28/2016     MAMMOGRAM  08/25/2017     TD 18+ HE  08/25/2020     COLORECTAL CANCER SCREENING  08/19/2021 (Originally 1950)     MEDICARE ANNUAL WELLNESS VISIT  11/18/2021     FALL RISK ASSESSMENT  11/18/2021     ADVANCE CARE PLANNING  10/15/2023     LIPID  11/18/2025     HEPATITIS C SCREENING  Completed     Pneumococcal Vaccine: 65+ Years  Completed     INFLUENZA VACCINE RULE BASED  Completed     Pneumococcal Vaccine: Pediatrics (0 to 5 Years) and At-Risk Patients (6 to 64 Years)  Aged Out     HEPATITIS B VACCINES  Aged Out        Subjective:   Chief Complaint: Xuan Anand is an 70 y.o. female here for an Annual Wellness visit.   HPI: No major complaint, only taking HCTZ and Lipitor, stopped taking the thyroid medication several months ago, taking baby aspirin intermittently.  She does mention a history of ulcer diagnosed in [place] years ago, but denies any blood in the stool.    Review of Systems:    Please see above.  The rest of the review of systems are negative for all systems.    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, unspecified hemiplegia type (H)     Past Medical History:   Diagnosis Date     Abnormal Heart Sounds     Created by Conversion      Cerumen Impaction     Created by Conversion      Disease of thyroid gland      DJD of right shoulder 10/15/2018     Essential hypertension 10/20/2015     Hypertension      Hypothyroidism     Created by  [facility] Annotation: Nov 7 2010  8:39PM - Macrina Herrera: borderline  Replacement Utility updated for latest IMO load     Vitamin D deficiency     Created by [facility] Annotation: Nov 7 2010  8:39PM - Macrina Herrera: severe  Replacement Utility updated for latest IMO load      No past surgical history on file.   No family history on file.   Social History     Socioeconomic History     Marital status:      Spouse name: Not on file     Number of children: Not on file     Years of education: Not on file     Highest education level: Not on file   Occupational History     Not on file   Social Needs     Financial resource strain: Not on file     Food insecurity     Worry: Not on file     Inability: Not on file     Transportation needs     Medical: Not on file     Non-medical: Not on file   Tobacco Use     Smoking status: Never Smoker     Smokeless tobacco: Never Used   Substance and Sexual Activity     Alcohol use: No     Drug use: No     Sexual activity: Not on file   Lifestyle     Physical activity     Days per week: Not on file     Minutes per session: Not on file     Stress: Not on file   Relationships     Social connections     Talks on phone: Not on file     Gets together: Not on file     Attends Taoist service: Not on file     Active member of club or organization: Not on file     Attends meetings of clubs or organizations: Not on file     Relationship status: Not on file     Intimate partner violence     Fear of current or ex partner: Not on file     Emotionally abused: Not on file     Physically abused: Not on file     Forced sexual activity: Not on file   Other Topics Concern     Not on file   Social History Narrative     Not on file      Current Outpatient Medications   Medication Sig Dispense Refill     aspirin 81 MG EC tablet Take 1 tablet (81 mg total) by mouth daily. (Patient taking differently: Take 81 mg by mouth every other day. )  0     atorvastatin (LIPITOR)  "40 MG tablet Take 1 tablet (40 mg total) by mouth daily. 30 tablet 0     cholecalciferol, vitamin D3, 1,000 unit tablet Take 1,000 Units by mouth daily.       hydroCHLOROthiazide (HYDRODIURIL) 25 MG tablet Take 1 tablet (25 mg total) by mouth daily. 90 tablet 3     levothyroxine (SYNTHROID, LEVOTHROID) 25 MCG tablet TAKE 1 TABLET BY MOUTH DAILY AT 6:00 AM 90 tablet 3     UNABLE TO FIND Med Name: Root herbal (powder)       No current facility-administered medications for this visit.       Objective:   Vital Signs:   Visit Vitals  /69 (Patient Site: Right Arm, Patient Position: Sitting, Cuff Size: Adult Regular)   Pulse 69   Temp 97.2  F (36.2  C) (Tympanic)   Resp 17   Ht 5' 2.21\" (1.58 m)   Wt 155 lb (70.3 kg)   BMI 28.16 kg/m           VisionScreening:  No exam data present     PHYSICAL EXAM  Physical Examination: General appearance - alert, well appearing, and in no distress  Mental status - alert, oriented to person, place, and time  Eyes - pupils equal and reactive, extraocular eye movements intact  Ears - bilateral TM's and external ear canals normal  Nose - normal and patent, no erythema, discharge or polyps  Mouth - mucous membranes moist, pharynx normal without lesions  Neck - supple, no significant adenopathy  Lymphatics - no palpable lymphadenopathy, no hepatosplenomegaly  Chest - clear to auscultation, no wheezes, rales or rhonchi, symmetric air entry  Heart - normal rate, regular rhythm, normal S1, S2, no murmurs, rubs, clicks or gallops  Abdomen - soft, nontender, nondistended, no masses or organomegaly  Back exam - full range of motion, no tenderness, palpable spasm or pain on motion  Neurological - alert, oriented, normal speech, no focal findings or movement disorder noted  Musculoskeletal - no joint tenderness, deformity or swelling  Extremities - peripheral pulses normal, no pedal edema, no clubbing or cyanosis  Skin - normal coloration and turgor, no rashes, no suspicious skin lesions " noted    Assessment Results 11/18/2020   Activities of Daily Living No help needed   Instrumental Activities of Daily Living No help needed   Mini Cog Total Score 5   Some recent data might be hidden     A Mini-Cog score of 0-2 suggests the possibility of dementia, score of 3-5 suggests no dementia    Identified Health Risks:     The patient was provided with suggestions to help her develop a healthy physical lifestyle.   Information on urinary incontinence and treatment options given to patient.  Information regarding advance directives (living hernandez), including where she can download the appropriate form, was provided to the patient via the AVS.

## 2021-06-14 NOTE — TELEPHONE ENCOUNTER
Refill Approved    Rx renewed per Medication Renewal Policy. Medication was last renewed on 1/21/20, last 11/18/20.    Susan Meneses, [facility] Triage/Med Refill 1/24/2021     Requested Prescriptions   Pending Prescriptions Disp Refills     hydroCHLOROthiazide (HYDRODIURIL) 25 MG tablet [Pharmacy Med Name: HYDROCHLOROTHIAZIDE 25MG TABLETS] 90 tablet 3     Sig: TAKE 1 TABLET BY MOUTH DAILY       Diuretics/Combination Diuretics Refill Protocol  Passed - 1/23/2021  3:14 AM        Passed - Visit with PCP or prescribing provider visit in past 12 months     Last office visit with prescriber/PCP: 1/21/2020 Carlee Hull MD OR same dept: Visit date not found OR same specialty: 1/21/2020 Carlee Hull MD  Last physical: 11/18/2020 Last MTM visit: Visit date not found   Next visit within 3 mo: Visit date not found  Next physical within 3 mo: Visit date not found  Prescriber OR PCP: Carlee Hull MD  Last diagnosis associated with med order: 1. Essential hypertension  - hydroCHLOROthiazide (HYDRODIURIL) 25 MG tablet [Pharmacy Med Name: HYDROCHLOROTHIAZIDE 25MG TABLETS]; TAKE 1 TABLET BY MOUTH DAILY  Dispense: 90 tablet; Refill: 3    If protocol passes may refill for 12 months if within 3 months of last provider visit (or a total of 15 months).             Passed - Serum Potassium in past 12 months      Lab Results   Component Value Date    Potassium 3.9 11/18/2020             Passed - Serum Sodium in past 12 months      Lab Results   Component Value Date    Sodium 141 11/18/2020             Passed - Blood pressure on file in past 12 months     BP Readings from Last 1 Encounters:   11/18/20 133/69             Passed - Serum Creatinine in past 12 months      Creatinine   Date Value Ref Range Status   11/18/2020 0.76 0.60 - 1.10 mg/dL Final

## 2021-06-16 PROBLEM — I63.9 ACUTE CVA (CEREBROVASCULAR ACCIDENT) (H): Status: ACTIVE | Noted: 2019-08-30

## 2021-06-16 PROBLEM — E78.2 MIXED HYPERLIPIDEMIA: Status: ACTIVE | Noted: 2020-11-19

## 2021-06-16 PROBLEM — M19.011 DJD OF RIGHT SHOULDER: Status: ACTIVE | Noted: 2018-10-15

## 2021-06-16 PROBLEM — I69.354 HEMIPLEGIA OF LEFT NONDOMINANT SIDE AS LATE EFFECT OF CEREBRAL INFARCTION, UNSPECIFIED HEMIPLEGIA TYPE (H): Status: ACTIVE | Noted: 2020-01-21

## 2021-06-16 PROBLEM — R73.03 PREDIABETES: Status: ACTIVE | Noted: 2020-11-19

## 2021-06-17 NOTE — PROGRESS NOTES
OFFICE VISIT - FAMILY MEDICINE     ASSESSMENT AND PLAN     1. Essential hypertension  Glycosylated Hemoglobin A1c    Comprehensive Metabolic Panel    Thyroid Stimulating Hormone (TSH)    T4, Free    T3, Total    Lipid Cascade RANDOM    Microalbumin, Random Urine    Urinalysis    Thyroid Peroxidase Antibody    hydroCHLOROthiazide (HYDRODIURIL) 25 MG tablet   2. Hypothyroidism  Glycosylated Hemoglobin A1c    Comprehensive Metabolic Panel    Thyroid Stimulating Hormone (TSH)    T4, Free    T3, Total    Lipid Cascade RANDOM    Microalbumin, Random Urine    Urinalysis    Thyroid Peroxidase Antibody    levothyroxine (SYNTHROID, LEVOTHROID) 25 MCG tablet   3. Right otitis externa  neomycin-colist-HC-thonzonium (CORTISPORIN-TC) 3.3-3-10-0.5 mg/mL DrpS otic suspension   4. Postsurgical malabsorption, not elsewhere classified (CODE)   Glycosylated Hemoglobin A1c   5. Screen for colon cancer  Occult Blood(ICT)   6. Visit for screening mammogram  Mammo Screening Bilateral   Hypertension l, hypothyroidism uncontrolled because patient was not taking her medication, doing some lab work today, medication were refilled, follow-up in about a month for recheck.  Right otitis externa, topical Corticosporin drop prescribed to use as directed follow-up if not improving.  Healthcare maintenance discussed about screening colonoscopy,mammogram and Pap smear,she will make a separate appointment with a provider of choice.  CHIEF COMPLAINT   Hypertension and Labs Only    HPI   Xuan Anand is a 67 y.o. female.  No Patient Care Coordination Note on file.  Has not been seen for a couple of years, because of the lapse of insurance she just recently got her new insurance and she would like to follow-up on chronic issues, she has hypertension, has not been taking her medication for a few years now, hypothyroidism, has not been taking any medication.  She denies any headache dizziness or cold or heat intolerance.  Has been experiencing right ear pain  "for the past few days, has been using Q-tip with no improvement.  She is also asking for some lab work to be done to make sure that she is doing fine.  We also discussed about healthcare maintenance she declines screening colonoscopy.      Review of Systems As per HPI, otherwise negative.    OBJECTIVE   /84 (Patient Site: Left Arm, Patient Position: Sitting, Cuff Size: Adult Regular)  Pulse (!) 58  Resp 15  Ht 5' 2\" (1.575 m)  Wt 153 lb (69.4 kg)  SpO2 97%  BMI 27.98 kg/m2  Physical Exam   Constitutional: She is oriented to person, place, and time. She appears well-developed and well-nourished.   HENT:   Head: Normocephalic and atraumatic.   Right Ear: No drainage.   Mild erythema in the right ear canal   Neck: Normal range of motion. Neck supple. No JVD present. No tracheal deviation present. No thyromegaly present.   Cardiovascular: Normal rate, regular rhythm, normal heart sounds and intact distal pulses.  Exam reveals no gallop and no friction rub.    No murmur heard.  Pulmonary/Chest: Effort normal and breath sounds normal. No respiratory distress. She has no wheezes. She has no rales.   Musculoskeletal: She exhibits no edema or tenderness.   Lymphadenopathy:     She has no cervical adenopathy.   Neurological: She is alert and oriented to person, place, and time. Coordination normal.   Psychiatric: She has a normal mood and affect. Judgment and thought content normal.       PFSH   No family history on file.  Social History     Social History     Marital status:      Spouse name: N/A     Number of children: N/A     Years of education: N/A     Occupational History     Not on file.     Social History Main Topics     Smoking status: Never Smoker     Smokeless tobacco: Never Used     Alcohol use No     Drug use: No     Sexual activity: Not on file     Other Topics Concern     Not on file     Social History Narrative     Relevant history was reviewed with the patient today, unless noted in HPI, " nothing is pertinent for this visit.  [facility]     Patient Active Problem List    Diagnosis Date Noted     Essential hypertension 10/20/2015     Hypothyroidism      Overview Note:     Created by Conversion  [facility] Annotation: Nov 7 2010  8:39PM - Macrina Herrera: borderline    Replacement Utility updated for latest IMO load       Vitamin D Deficiency      Overview Note:     Created by Conversion  [facility] Annotation: Nov 7 2010  8:39PM - Macrina Herrera: severe    Replacement Utility updated for latest IMO load       Cerumen Impaction      Overview Note:     Created by Conversion         Rhythm Disorder      Overview Note:     Created by Conversion    Replacement Utility updated for latest IMO load       Abnormal Heart Sounds      Overview Note:     Created by Conversion         No past surgical history on file.    RESULTS/CONSULTS (Lab/Rad)     Recent Results (from the past 168 hour(s))   Glycosylated Hemoglobin A1c   Result Value Ref Range    Hemoglobin A1c 5.8 3.5 - 6.0 %   Comprehensive Metabolic Panel   Result Value Ref Range    Sodium 141 136 - 145 mmol/L    Potassium 4.5 3.5 - 5.0 mmol/L    Chloride 102 98 - 107 mmol/L    CO2 30 22 - 31 mmol/L    Anion Gap, Calculation 9 5 - 18 mmol/L    Glucose 101 70 - 125 mg/dL    BUN 14 8 - 22 mg/dL    Creatinine 0.81 0.60 - 1.10 mg/dL    GFR MDRD Af Amer >60 >60 mL/min/1.73m2    GFR MDRD Non Af Amer >60 >60 mL/min/1.73m2    Bilirubin, Total 0.6 0.0 - 1.0 mg/dL    Calcium 9.4 8.5 - 10.5 mg/dL    Protein, Total 7.2 6.0 - 8.0 g/dL    Albumin 3.7 3.5 - 5.0 g/dL    Alkaline Phosphatase 86 45 - 120 U/L    AST 34 0 - 40 U/L    ALT 47 (H) 0 - 45 U/L   Thyroid Stimulating Hormone (TSH)   Result Value Ref Range    TSH 5.12 (H) 0.30 - 5.00 uIU/mL   T4, Free   Result Value Ref Range    Free T4 0.9 0.7 - 1.8 ng/dL   T3, Total   Result Value Ref Range    T3, Total 77 45 - 175 ng/dL   Lipid Cascade RANDOM   Result Value Ref Range    Cholesterol 249 (H) <=199 mg/dL     Triglycerides 76 <=149 mg/dL    HDL Cholesterol 66 >=50 mg/dL    LDL Calculated 168 (H) <=129 mg/dL    Patient Fasting > 8hrs? Yes    Thyroid Peroxidase Antibody   Result Value Ref Range    Thyroid Peroxidase Ab <3.0 0.0 - 5.6 IU/mL   Microalbumin, Random Urine   Result Value Ref Range    Microalbumin, Random Urine 0.55 0.00 - 1.99 mg/dL    Creatinine, Urine 42.5 mg/dL    Microalbumin/Creatinine Ratio Random Urine 12.9 <=19.9 mg/g   Urinalysis   Result Value Ref Range    Color, UA Yellow Colorless, Yellow, Straw, Light Yellow    Clarity, UA Clear Clear    Glucose, UA Negative Negative    Bilirubin, UA Negative Negative    Ketones, UA Negative Negative    Specific Gravity, UA 1.010 1.005 - 1.030    Blood, UA Trace (!) Negative    pH, UA 7.0 5.0 - 8.0    Protein, UA Negative Negative mg/dL    Urobilinogen, UA 0.2 E.U./dL 0.2 E.U./dL, 1.0 E.U./dL    Nitrite, UA Negative Negative    Leukocytes, UA Negative Negative    Bacteria, UA None Seen None Seen hpf    RBC, UA 0-2 None Seen, 0-2 hpf    WBC, UA None Seen None Seen, 0-5 hpf    Squam Epithel, UA 0-5 None Seen, 0-5 lpf     No results found.  MEDICATIONS     Current Outpatient Prescriptions on File Prior to Visit   Medication Sig Dispense Refill     cholecalciferol, vitamin D3, 1,000 unit tablet Take 1,000 Units by mouth daily.       UNABLE TO FIND Med Name: Root herbal (powder)       [DISCONTINUED] levothyroxine (SYNTHROID, LEVOTHROID) 25 MCG tablet Take 1 tablet (25 mcg total) by mouth Daily at 6:00 am. 90 tablet 3     [DISCONTINUED] hydrochlorothiazide (HYDRODIURIL) 25 MG tablet Take 1 tablet (25 mg total) by mouth daily. 30 tablet 11     No current facility-administered medications on file prior to visit.        HEALTH MAINTENANCE / SCREENING   No Data Recorded, No Data Recorded,No Data Recorded  Immunization History   Administered Date(s) Administered     DT (pediatric) 01/01/1997     Influenza, inj, historic,unspecified 11/02/2010, 09/13/2012     Tdap 08/25/2010      Health Maintenance   Topic     ZOSTER VACCINE      DXA SCAN      PNEUMOCOCCAL POLYSACCHARIDE VACCINE AGE 65 AND OVER      PNEUMOCOCCAL CONJUGATE VACCINE FOR ADULTS (PCV13 OR PREVNAR)      FALL RISK ASSESSMENT      FECAL OCCULT BLOOD/FIT ANNUAL COLON CANCER SCREENING      INFLUENZA VACCINE RULE BASED (1)     MAMMOGRAM      ADVANCE DIRECTIVES DISCUSSED WITH PATIENT      TD 18+ HE      I have had an Advance Directives discussion with the patient.  Carlee Hull MD  Family Medicine, [facility]     This note was dictated using a voice recognition software.  Any grammatical or context distortion are unintentional and inherent to the software.

## 2021-06-18 NOTE — PATIENT INSTRUCTIONS - HE
Patient Instructions by Carlee Hull MD at 11/18/2020  3:40 PM     Author: Carlee Hull MD Service: -- Author Type: Physician    Filed: 11/18/2020  3:43 PM Encounter Date: 11/18/2020 Status: Signed    : Carlee Hull MD (Physician)         Patient Education     Your Health Risk Assessment indicates you feel you are not in good physical health.    A healthy lifestyle helps keep the body fit and the mind alert. It helps protect you from disease, helps you fight disease, and helps prevent chronic disease (disease that doesn't go away) from getting worse. This is important as you get older and begin to notice twinges in muscles and joints and a decline in the strength and stamina you once took for granted. A healthy lifestyle includes good healthcare, good nutrition, weight control, recreation, and regular exercise. Avoid harmful substances and do what you can to keep safe. Another part of a healthy lifestyle is stay mentally active and socially involved.    Good healthcare     Have a wellness visit every year.     If you have new symptoms, let us know right away. Don't wait until the next checkup.     Take medicines exactly as prescribed and keep your medicines in a safe place. Tell us if your medicine causes problems.   Healthy diet and weight control     Eat 3 or 4 small, nutritious, low-fat, high-fiber meals a day. Include a variety of fruits, vegetables, and whole-grain foods.     Make sure you get enough calcium in your diet. Calcium, vitamin D, and exercise help prevent osteoporosis (bone thinning).     If you live alone, try eating with others when you can. That way you get a good meal and have company while you eat it.     Try to keep a healthy weight. If you eat more calories than your body uses for energy, it will be stored as fat and you will gain weight.     Recreation   Recreation is not limited to sports and team events. It includes any activity that provides  relaxation, interest, enjoyment, and exercise. Recreation provides an outlet for physical, mental, and social energy. It can give a sense of worth and achievement. It can help you stay healthy.       Patient Education   Urinary Incontinence, Female (Adult)  Urinary incontinence means loss of control of the bladder. This problem affects many women, especially as they get older. If you have incontinence, you may be embarrassed to ask for help. But know that this problem can be treated.  Types of Incontinence  There are different types of incontinence. Two of the main types are described here. You can have more than one type.    Stress incontinence. With this type, urine leaks when pressure (stress) is put on the bladder. This may happen when you cough, sneeze, or laugh. Stress incontinence most often occurs because the pelvic floor muscles that support the bladder and urethra are weak. This can happen after pregnancy and vaginal childbirth or a hysterectomy. It can also be due to excess body weight or hormone changes.    Urge incontinence (also called overactive bladder). With this type, a sudden urge to urinate is felt often. This may happen even though there may not be much urine in the bladder. The need to urinate often during the night is common. Urge incontinence most often occurs because of bladder spasms. This may be due to bladder irritation or infection. Damage to bladder nerves or pelvic muscles, constipation, and certain medicines can also lead to urge incontinence.  Treatment of urinary incontinence depends on the cause. Further evaluation is needed to find the type you have. This will likely include an exam and certain tests. Based on the results, you and your healthcare provider can then plan treatment. Until a diagnosis is made, the home care tips below can help relieve symptoms.  Home care    Do pelvic floor muscle exercises, if they are prescribed. The pelvic floor muscles help support the bladder and  urethra. Many women find that their symptoms improve when doing special exercises that strengthen these muscles. To do the exercises contract the muscles you would use to stop your stream of urine, but do this when youre not urinating. Hold for 10 seconds, then relax. Repeat 10 to 20 times in a row, at least 3 times a day. Your provider may give you other instructions for how to do the exercises and how often.    Keep a bladder diary. This helps track how often and how much you urinate over a set period of time. Bring this diary with you to your next visit with the provider. The information can help your provider learn more about your bladder problem.    Lose weight, if advised to by your provider. Excess weight puts pressure on the bladder. Your provider can help you create a weight-loss plan thats right for you. This may include exercising more and making certain diet changes.    Don't consume foods and drinks that may irritate the bladder. These can include alcohol and caffeinated drinks.    Quit smoking. Smoking and other tobacco use can lead to chronic cough that strains the pelvic floor muscles. Smoking may also damage the bladder and urethra. Talk with your provider about treatments or methods you can use to quit smoking.    If drinking large amounts of fluid causes you to have symptoms, you may be advised to limit your fluid intake. You may also be advised to drink most of your fluids during the day and to limit fluids at night.    If youre worried about urine leakage or accidents, you may wear absorbent pads to catch urine. Change the pads often. This helps reduce discomfort. It may also reduce the risk of skin or bladder infections.  Follow-up care  Follow up with your healthcare provider, or as directed. It may take some to find the right treatment for your problem. Your treatment plan may include special therapies or medicines. Certain procedures or surgery may also be options. Be sure to discuss any  questions you have with your provider.  When to seek medical advice  Call the healthcare provider right away if any of these occur:    Fever of 100.4 F (38 C) or higher, or as directed by your provider    Bladder pain or fullness    Abdominal swelling    Nausea or vomiting    Back pain    Weakness, dizziness or fainting  Date Last Reviewed: 10/1/2017    5725-3510 [employer]. [address] [place] [address]. All rights reserved. This information is not intended as a substitute for professional medical care. Always follow your healthcare professional's instructions.     Patient Education   Understanding Advance Care Planning  Advance care planning is the process of deciding ones own future medical care. It helps ensure that if you cant speak for yourself, your wishes can still be carried out. The plan is a series of legal documents that note a persons wishes. The documents vary by state. Advance care planning may be done when a person has a serious illness that is expected to get worse. It may be done before major surgery. And it can help you and your family be prepared in case of a major illness or injury. Advance care planning helps with making decisions at these times.       A health care proxy is a person who acts as the voice of a patient when the patient cant speak for himself or herself. The name of this role varies by state. It may be called a Durable Medical Power of  or Durable Power of  for Healthcare. It may be called an agent, surrogate, or advocate. Or it may be called a representative or decision maker. It is an official duty that is identified by a legal document. The document also varies by state.    Why Is Advance Care Planning Important?  If a person communicates their healthcare wishes:    They will be given medical care that matches their values and goals.    Their family members will not be forced to make decisions in a crisis with no guidance.  Creating a  Plan  Making an advance care plan is often done in 3 steps:    Thinking about ones wishes. To create an advance care plan, you should think about what kind of medical treatment you would want if you lose the ability to communicate. Are there any situations in which you would refuse or stop treatment? Are there therapies you would want or not want? And whom do you want to make decisions for you? There are many places to learn more about how to plan for your care. Ask your doctor or  for resources.    Picking a health care proxy. This means choosing a trusted person to speak for you only when you cant speak for yourself. When you cannot make medical decisions, your proxy makes sure the instructions in your advance care plan are followed. A proxy does not make decisions based on his or her own opinions. They must put aside those opinions and values if needed, and carry out your wishes.    Filling out the legal documents. There are several kinds of legal documents for advance care planning. Each one tells health care providers your wishes. The documents may vary by state. They must be signed and may need to be witnessed or notarized. You can cancel or change them whenever you wish. Depending on your state, the documents may include a Healthcare Proxy form, Living Will, Durable Medical Power of , Advance Directive, or others.  The Familys Role  The best help a family can give is to support their loved ones wishes. Open and honest communication is vital. Family should express any concerns they have about the patients choices while the patient can still make decisions.    6173-5931 [employer]. [address] [place] [address], [place] [address]. All rights reserved. This information is not intended as a substitute for professional medical care. Always follow your healthcare professional's instructions.         Also, [employer] [place] offers a free, downloadable health care directive that  allows you to share your treatment choices and personal preferences if you cannot communicate your wishes. It also allows you to appoint another person (called a health care agent) to make health care decisions if you are unable to do so. You can download an advance directive by going here: http://www.[facility].org/honoring-choices.[email or web address]     Patient Education   Personalized Prevention Plan  You are due for the preventive services outlined below.  Your care team is available to assist you in scheduling these services.  If you have already completed any of these items, please share that information with your care team to update in your medical record.  Health Maintenance   Topic Date Due   ? ZOSTER VACCINES (1 of 2) 07/28/2001   ? DXA SCAN  07/28/2016   ? COLORECTAL CANCER SCREENING  08/21/2016   ? MAMMOGRAM  08/25/2017   ? Pneumococcal Vaccine: 65+ Years (2 of 2 - PPSV23) 10/15/2019   ? INFLUENZA VACCINE RULE BASED (1) 08/01/2020   ? TD 18+ HE  08/25/2020   ? MEDICARE ANNUAL WELLNESS VISIT  11/18/2021   ? FALL RISK ASSESSMENT  11/18/2021   ? ADVANCE CARE PLANNING  10/15/2023   ? LIPID  01/21/2025   ? HEPATITIS C SCREENING  Completed   ? Pneumococcal Vaccine: Pediatrics (0 to 5 Years) and At-Risk Patients (6 to 64 Years)  Aged Out   ? HEPATITIS B VACCINES  Aged Out

## 2021-06-19 NOTE — LETTER
Letter by Carlee Hull MD at      Author: Carlee Hull MD Service: -- Author Type: --    Filed:  Encounter Date: 10/23/2019 Status: Signed         Xuan Anand  [address] [place] [id number]             October 23, 2019         Dear Ms. Anand,    Below are the results from your recent visit:    Resulted Orders   CHAN Monitor Hook-Up    Narrative    SYMPTOM TRIGGERED MONITOR REPORT    IMPRESSION:  1.  A symptom triggered monitor was scheduled for 30 days from 09/17/2019   through  10/19/2019.  2.  Baseline transmission showed sinus rhythm with normal   electrocardiographic  intervals.  3.  Average heart rate was 73 beats per minute, ranged between 50 and 148   beats per  minute.  4.  Patient had no symptomatic events.  5.  Automatic transmissions on 10/07 showed an episode of irregular atrial  dysrhythmia, probably atrial fibrillation, lasting about 10 seconds with a   rate of  119 beats per minute.  6.  An episode of atrial arrhythmia lasting about 10 seconds occurred   10/08 at 10:30  p.m., which was either chaotic atrial ectopy or short bursts of atrial   fibrillation.    DISCUSSION:    1.  Two short bursts of chaotic atrial activity that may represent brief   episodes of  atrial fibrillation lasting about 10 seconds and are not reported as  symptoms.  2.  Otherwise unremarkable monitor.      SALINA CALIX MD  sn  D 10/21/2019 17:39:26  T 10/21/2019 17:51:49  R 10/21/2019 18:21:21  [id number]       Recent Holter monitor testing did show some abnormalities, I would like you to follow with our cardiologist, please call the clinic to schedule an appointment .    Please call with questions or contact us using [employer].    Sincerely,        Electronically signed by Carlee Hull MD

## 2021-06-19 NOTE — LETTER
Letter by Carlee Hull MD at      Author: Carlee Hull MD Service: -- Author Type: --    Filed:  Encounter Date: 10/23/2019 Status: Signed         Xuan Anand  [address] [place] [id number]             November 11, 2019         Dear Ms. Anand,    Below are the results from your recent visit:    Resulted Orders   CHAN Monitor Hook-Up    Narrative    SYMPTOM TRIGGERED MONITOR REPORT    IMPRESSION:  1.  A symptom triggered monitor was scheduled for 30 days from 09/17/2019   through  10/19/2019.  2.  Baseline transmission showed sinus rhythm with normal   electrocardiographic  intervals.  3.  Average heart rate was 73 beats per minute, ranged between 50 and 148   beats per  minute.  4.  Patient had no symptomatic events.  5.  Automatic transmissions on 10/07 showed an episode of irregular atrial  dysrhythmia, probably atrial fibrillation, lasting about 10 seconds with a   rate of  119 beats per minute.  6.  An episode of atrial arrhythmia lasting about 10 seconds occurred   10/08 at 10:30  p.m., which was either chaotic atrial ectopy or short bursts of atrial   fibrillation.    DISCUSSION:    1.  Two short bursts of chaotic atrial activity that may represent brief   episodes of  atrial fibrillation lasting about 10 seconds and are not reported as  symptoms.  2.  Otherwise unremarkable monitor.      SALINA CALIX MD  sn  D 10/21/2019 17:39:26  T 10/21/2019 17:51:49  R 10/21/2019 18:21:21  [id number]       Recent Holter monitor testing did show some abnormalities, I would like you to follow with our cardiologist, please call the clinic to schedule an appointment .    Please call with questions or contact us using [employer].    Sincerely,        Electronically signed by Carlee Hull MD

## 2021-06-20 NOTE — LETTER
"Letter by Carlee Hull MD at      Author: Carlee Hull MD Service: -- Author Type: --    Filed:  Encounter Date: 1/22/2020 Status: (Other)         Xuan Anand  [address] [place] [id number]             January 22, 2020         Dear Ms. Anand,    Below are the results from your recent visit:    Resulted Orders   Glycosylated Hemoglobin A1c   Result Value Ref Range    Hemoglobin A1c 6.0 3.5 - 6.0 %   Comprehensive Metabolic Panel   Result Value Ref Range    Sodium 142 136 - 145 mmol/L    Potassium 4.0 3.5 - 5.0 mmol/L    Chloride 103 98 - 107 mmol/L    CO2 31 22 - 31 mmol/L    Anion Gap, Calculation 8 5 - 18 mmol/L    Glucose 82 70 - 125 mg/dL    BUN 11 8 - 22 mg/dL    Creatinine 0.79 0.60 - 1.10 mg/dL    GFR MDRD Af Amer >60 >60 mL/min/1.73m2    GFR MDRD Non Af Amer >60 >60 mL/min/1.73m2    Bilirubin, Total 0.5 0.0 - 1.0 mg/dL    Calcium 8.9 8.5 - 10.5 mg/dL    Protein, Total 6.9 6.0 - 8.0 g/dL    Albumin 3.8 3.5 - 5.0 g/dL    Alkaline Phosphatase 72 45 - 120 U/L    AST 21 0 - 40 U/L    ALT 21 0 - 45 U/L    Narrative    Fasting Glucose reference range is 70-99 mg/dL per  American Diabetes Association (ADA) guidelines.   Lipid Cascade   Result Value Ref Range    Cholesterol 231 (H) <=199 mg/dL    Triglycerides 169 (H) <=149 mg/dL    HDL Cholesterol 57 >=50 mg/dL    LDL Calculated 140 (H) <=129 mg/dL    Patient Fasting > 8hrs? Unknown    Microalbumin, Random Urine   Result Value Ref Range    Microalbumin, Random Urine <0.50 0.00 - 1.99 mg/dL    Creatinine, Urine 34.7 mg/dL    Microalbumin/Creatinine Ratio Random Urine        Comment:      \"Unable to calculate: Creatinine and/or Microalbumin value below detectable level\"    Narrative    Microalbumin, Random Urine  <2.0 mg/dL . . . . . . . . Normal  3.0-30.0 mg/dL . . . . . . Microalbuminuria  >30.0 mg/dL . . . . . .  . Clinical Proteinuria    Microalbumin/Creatinine Ratio, Random Urine  <20 mg/g . . . . .. . . . Normal   mg/g . . . " . . . . Microalbuminuria  >300 mg/g . . . . . . . . Clinical Proteinuria       Thyroid Cascade   Result Value Ref Range    TSH 4.17 0.30 - 5.00 uIU/mL   Hepatitis C Antibody (Anti-HCV)   Result Value Ref Range    Hepatitis C Ab Negative Negative   HM2(CBC w/o Differential)   Result Value Ref Range    WBC 4.1 4.0 - 11.0 thou/uL    RBC 4.52 3.80 - 5.40 mill/uL    Hemoglobin 13.6 12.0 - 16.0 g/dL    Hematocrit 40.9 35.0 - 47.0 %    MCV 90 80 - 100 fL    MCH 30.0 27.0 - 34.0 pg    MCHC 33.2 32.0 - 36.0 g/dL    RDW 11.9 11.0 - 14.5 %    Platelets 254 140 - 440 thou/uL    MPV 6.9 (L) 7.0 - 10.0 fL   Thyroid Peroxidase Antibody   Result Value Ref Range    Thyroid Peroxidase Ab <3.0 0.0 - 5.6 IU/mL       Cholesterol still mildly elevated, continue with Lipitor.  Blood sugar is at the prediabetes ranges, continue healthy lifestyle changes, regular physical activities, recheck in 6 months.    Please call with questions or contact us using "[employer]".    Sincerely,        Electronically signed by Carlee Hull MD

## 2021-06-21 NOTE — PROGRESS NOTES
[facility]   Shoulder Initial Evaluation    Patient Name: Xuan Anand  PRECAUTIONS/RESTRICTIONS:  None  Date of evaluation: 11/8/2018  Referral Diagnosis: Chronic Right Shoulder Pain, Acute Right Groin Pain  Referring provider: Carlee Hull *  Visit Diagnosis:     ICD-10-CM    1. Chronic right shoulder pain M25.511 Ambulatory referral to PT/OT    G89.29    2. Groin pain, right R10.31    3. Decreased ROM of right shoulder M25.611    4. Poor posture R29.3    5. Muscle weakness (generalized) M62.81        Assessment:      Skilled PT is required to modulate pain, increase ROM/strength/posture/function through manual therapy/modalities, exercise and education.  Pt. is appropriate for skilled PT intervention as outlined in the Plan of Care (POC).  Pt. is a good candidate for skilled PT services to improve pain levels and function.    Goals:  Pt. will demonstrate/verbalize independence in self-management of condition in : 6 weeks  Pt. 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 in 6 weeks.    Patient's expectations/goals are realistic.    Barriers to Learning or Achieving Goals:  Chronicity of the problem.       Plan / Patient Instructions:        Plan of Care:   Communication with: Referral Source  Patient Related Instruction: Nature of Condition;Treatment plan and rationale;Self Care instruction;Basis of treatment;Posture;Precautions;Body mechanics;Next steps;Expected outcome  Times per Week: 1  Number of Weeks: 8  Number of Visits: 8  Discharge Planning: INdependent HEP and self-management of symptoms  Precautions /  Restrictions : None  Therapeutic Exercise: ROM;Stretching;Strengthening  Neuromuscular Reeducation: kinesio tape;posture  Manual Therapy: soft tissue mobilization;myofascial release;joint mobilization  Modalities: ultrasound  Equipment: theraband;other  Equipment: kinesiotape    POC and pathology of condition were reviewed with patient.  Pt. is in agreement with the Plan of Care  A Home Exercise Program (HEP) was initiated today.  Pt. was instructed in exercises by PT and patient was given a handout with detailed instructions.    The goals and plan of care were established in collaboration with the patient.    Plan for next visit: see 1x/week for 5 more visits then reassess needs/progress and continue x3 more if beneficial.  Next:  Add row/pull downs, assess ROM for progress, add wand scaption, hand held or towel assist IR, SL ER, assess hip ROM/strength and tenderness    .   Subjective:       Social information:      Occupation:  [employer]   Work Status: Full time   Equipment Available: recently order a posture correction brace but has not arrived/used yet.    History of Present Illness:    Xuan Anand is a 68 y.o. female who presents to therapy today with chief complaints of right shoulder pain, onset one year ago attributing pain to performing some yoga poses.  Right groin pain is better at this time just a little stiff in the morning but gets better.  Right groin pain onset was abrupt in 8/2018 without incident, having difficulty standing erect.  Spending some time in [facility], receiving some treatment seems to have fairly well resolved her pain except for the mild morning stiffness.  She reports no prior episodes of similar pain in either area.  She denies any numbness or tingling.    Pt demo's signs and sx consistent with poor posture affecting good biomechanical shoulder movement with some aspects of impingement and adhesive capsulitis .     Pain Rating current:  4-5  Pain rating at best: 0  Pain rating at worst:  "4-5  Pain description: sore/ \"screwdriver-type\" pain    Functional limitations are described as occurring with:   reach overhead into cupboards, reach up behind back to wash back/fasten bra posteriorly, stirring task as a baker.    Patient reports benefit from:  [employer]       Objective:      Note: Items left blank indicates the item was not performed or not indicated at the time of the evaluation.    Patient Outcome Measures :    Shoulder Pain and Disability Index (SPADI) in %: 30   Scores range from 0-100%, where a score of 0% represents minimal pain and maximal function. The minimal clincically important difference is a score reduction of 10%.  Shoulder Pain and Disability Index (SPADI) in %: 30  Lower Extremity Functional Scale (_/80): 78  Shoulder Examination  1. Chronic right shoulder pain  Ambulatory referral to PT/OT   2. Groin pain, right     3. Decreased ROM of right shoulder     4. Poor posture     5. Muscle weakness (generalized)       Precautions/Restrictions: None    Involved side: Right    Posture Observation:  Standing:  Cerv protraction, left shoulder/scap/iliac crest high, bilat pes planus       Cervical Clearing: Cervical ROM:  crepitus  Date: 11/8/2018     *Indicate scale AROM AROM AROM   Cervical Flexion Min-nil mild tightness     Cervical Extension Mod loss, tight/grinding      Right Left Right Left Right Left   Cervical Sidebending Min loss Stiff Mod loss, stiff/tight right neck       Cervical Rotation Mod loss, tight/stiff Min loss, stiff/tight       Cervical Protraction      Cervical Retraction      Thoracic Flexion      Thoracic Extension      Thoracic Sidebending         Thoracic Rotation             Flexibility:     Palpation: mild tenderness deltoid tuberosity, supraspinatus/subscapularis tendon, ac joint, medial coracoid process.    Joint Assessment:  Sternoclavicular Joint Assessment: Not Indicated  Acromioclavicular Joint Assessment: Not Indicated  Scapulothoracic Joint Assessment: " Hypomobile.  Glenohumeral Joint Assessment:Hypomobile.    Shoulder/Elbow ROM  NO grinding  Date: 11/8/2018     Shoulder and Elbow ROM ( )   AROM in degrees AROM in degrees AROM in degrees    Right Left Right Left Right Left   Shoulder Flexion (0-180 ) 115 end range pain 149       Shoulder Abduction (0-180 ) 129 pain and stiff 163       Shoulder Extension (0-60 ) 32 stiff 45       Shoulder ER (0-90 ) 68 70       Shoulder IR (0-70 ) L3-4 pain/stifff T9       Shoulder IR/Ext         Elbow Flexion (150 )         Elbow Extension (0 )          PROM in degrees PROM in degrees PROM in degrees    Right Left Right Left Right Left   Shoulder Flexion (0-180 )         Shoulder Abduction (0-180 )         Shoulder Extension (0-60 )         Shoulder ER (0-90 )         Shoulder IR (0-70 )         Elbow Flexion (150 )         Elbow Extension (0 )           Shoulder/Elbow Strength :  Tested [id number]/8/2018  Date:      Shoulder/Elbow Strength (/5)  Manual Muscle Test (MMT) MMT MMT MMT    Right Left Right Left Right Left   Shoulder Flexion 5        Supraspinatus         Shoulder Abduction 4+ pain        Shoulder Extension 5 mild pain        Shoulder External Rotation 4 pain        Shoulder Internal Rotation 4+ pain        Elbow Flexion         Elbow Extension         Other:  adduction No pain        Other:             Shoulder Special Tests     Impingement Cluster Right (+/-) Left (+/-) Rotator Cuff Tests Right (+/-) Left (+/-)   Kaur-Heath +  Drop Arm Sign     Painful Arc +  Hornblowers     Infraspinatus Test   ERLS     AC Tests Right (+/-) Left (+/-) IRLS     Active Compression   Labral Tests Right (+/-) Left (+/-)   Crossbody Adduction   Biceps Load Test II     AC Resisted Extension   Jerk Test     GH Instability Tests Right (+/-) Left (+/-) Verito Test     Sulcus Sign   Biceps Tests Right (+/-) Left (+/-)   Apprehension   Speed     Relocation   Dave corbett     Other:   Other:     Other:   Other:       Today's  "HEP:  Exercises:  Exercise #1: neck rom all planes-H  Comment #1: flex with wand supine, 5x-H  Exercise #2: Cerv/scap retraction, 10\"x5-H  Comment #2: Extension with wand, 5x-H  Exercise #3: IR with wand, 5x-H  Comment #3: 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    Tolerated exercises well and patient demonstrated good understanding.    Treatment Today  TREATMENT MINUTES COMMENTS   Evaluation 30 shoulder   Self-care/ Home management     Manual therapy     Neuromuscular Re-education 8 See flow sheet   Therapeutic Activity     Therapeutic Exercises 15 See flow sheet   Gait training     Modality__________________                Total 53    Blank areas are intentional and mean the treatment did not include these items.     PT Evaluation Code: (Please list factors)  Patient History/Comorbidities: see above  Examination: see above  Clinical Presentation: uncomplicated/stable  Clinical Decision Making: low    Patient History/  Comorbidities Examination  (body structures and functions, activity limitations, and/or participation restrictions) Clinical Presentation Clinical Decision Making (Complexity)   No documented Comorbidities or personal factors 1-2 Elements Stable and/or uncomplicated Low   1-2 documented comorbidities or personal factor 3 Elements Evolving clinical presentation with changing characteristics Moderate   3-4 documented comorbidities or personal factors 4 or more Unstable and unpredictable High                Isela Estrada  11/8/2018  8:23 AM               "

## 2021-06-21 NOTE — PROGRESS NOTES
[facility] Discharge Summary  Patient Name: Xuan Anand  Date: 11/15/2018  Referral Diagnosis: Chronic Right Shoulder Pain, Acute Right Groin Pain  Referring provider: Carlee Hull  Visit Diagnosis:   1. Chronic right shoulder pain     2. Groin pain, right     3. Decreased ROM of right shoulder     4. Poor posture     5. Muscle weakness (generalized)         Goals:  Pt. will demonstrate/verbalize independence in self-management of condition in : 6 weeks-progress toward goal  Pt. 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 in 6 weeks.-progress toward goal      Patient was seen for 2 visits from 11/8/2018 to 11/13/2018 with 0 missed appointments.  The patient made progress toward goals and has demonstrated understanding of and independence in the home program for self-care, and progression to next steps.  She will initiate contact if questions or concerns arise.  The patient was instructed to follow up with physician's clinic as needed.  Patient received a home program postural/shoulder/scapular strength, neck and shoulder ROM  The patient was issued theraband and instructed in proper usage.  Patient cancelled remaining appointments as [place] was [place].    Therapy will be discontinued at this time.  The patient will need a new referral to resume.    Thank you for your referral.  Isela Estrada  11/15/2018  7:16 AM      [facility] Daily Progress     Patient Name: Xuan Anand  PRECAUTIONS/RESTRICTIONS:   None  Date: 11/13/2018  Visit #: 2  PTA visit #:  0  Referral Diagnosis: Chronic Right Shoulder Pain, Acute Right Groin Pain  Referring provider: Carlee Hull   Visit Diagnosis:     ICD-10-CM    1. Chronic right shoulder pain M25.511     G89.29    2. Groin pain, right R10.31    3. Decreased ROM of right shoulder M25.611    4. Poor posture R29.3    5. Muscle weakness (generalized) M62.81          Assessment:     HEP/POC compliance is  fair to poor due to time contraints..  Patient demonstrates understanding/independence with home program.  Response to Intervention fair with improvement in ROM with less pain  Patient is benefitting from skilled physical therapy and is making steady progress toward functional goals.  Patient had to cancel all remaining visits as she will be visiting [facility] and is unsure when she will return.    Goal Status:  Pt. will demonstrate/verbalize independence in self-management of condition in : 6 weeks  Pt. 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      Plan / Patient Education:     Patient will be discharged at this time and will contact MD for new order up returning to country if further PT required.    Subjective:     Patient arrived 15 minutes late due to traffic.    Pain Rating: currently 0/10 but upon waking in the morning pain level 4-5/10  Poor compliance with HEP due to being busy.  Right groin is no longer problematic.    Response to PT/benefits:  Elevating arm with less pain and better ROM.  Better position for baking activities is helpful.    Continued difficulties:  reach  "overhead into cupboards, reach up behind back to wash back/fasten bra posteriorly, stirring task as a baker    Objective:     Shoulder AROM   11/8/2018 11/13/2018      Shoulder and Elbow ROM ( )    AROM in degrees AROM in degrees AROM in degrees    Right Left Right Left Right Left   Shoulder Flexion (0-180 ) 115 end range pain 149 139 ERP with popping          Shoulder Abduction (0-180 ) 129 pain and stiff 163 140 ERP         Shoulder Extension (0-60 ) 32 stiff 45  32         Shoulder ER (0-90 ) 68 70  68         Shoulder IR (0-70 ) L3-4 pain/stifff T9 L3-4 pain/stiff          Shoulder IR/Ext               Elbow Flexion (150 )               Elbow Extension (0 )                 Today's Exercises:  Exercises:  Exercise #1: neck rom all planes-H  Comment #1: flex with wand supine, 5x-verbal review  Exercise #2: Cerv/scap retraction, 10\"x5-verbal review  Comment #2: Extension with wand, 5x-verbal review  Exercise #3: IR with wand, 5x-verbal review  Comment #3: 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  Exercise #4: Row with GTB, 5x5\"-H  Comment #4: Pull down with GTB, 5x5\"-H  Exercise #5: IR with GTB, 5x5\"-H  Comment #5: ER at right SL, 10x with fatigue and achiness    Tolerated all new exercises well except ER with fatigue and achiness    Treatment Today    TREATMENT MINUTES COMMENTS   Evaluation     Self-care/ Home management     Manual therapy     Neuromuscular Re-education     Therapeutic Activity     Therapeutic Exercises 25 See flow sheet   Gait training     Modality__________________                Total 25    Blank areas are intentional and mean the treatment did not include these items.       Isela Estrada  11/13/2018    "

## 2021-06-21 NOTE — LETTER
"Letter by Carlee Hull MD at      Author: Carlee Hull MD Service: -- Author Type: --    Filed:  Encounter Date: 11/20/2020 Status: (Other)         Xuan Anand  [address] [place] [id number]             November 20, 2020         Dear Ms. Anand,    Below are the results from your recent visit:    Resulted Orders   Glycosylated Hemoglobin A1c   Result Value Ref Range    Hemoglobin A1c 5.7 (H) <=5.6 %      Comment:      Normal <5.7% Prediabete 5.7-6.4% Diabletes 6.5% or higher - adopted from ADA consensus guidelines   Comprehensive Metabolic Panel   Result Value Ref Range    Sodium 141 136 - 145 mmol/L    Potassium 3.9 3.5 - 5.0 mmol/L    Chloride 102 98 - 107 mmol/L    CO2 28 22 - 31 mmol/L    Anion Gap, Calculation 11 5 - 18 mmol/L    Glucose 94 70 - 125 mg/dL    BUN 20 8 - 22 mg/dL    Creatinine 0.76 0.60 - 1.10 mg/dL    GFR MDRD Af Amer >60 >60 mL/min/1.73m2    GFR MDRD Non Af Amer >60 >60 mL/min/1.73m2    Bilirubin, Total 0.2 0.0 - 1.0 mg/dL    Calcium 9.2 8.5 - 10.5 mg/dL    Protein, Total 7.2 6.0 - 8.0 g/dL    Albumin 4.0 3.5 - 5.0 g/dL    Alkaline Phosphatase 73 45 - 120 U/L    AST 22 0 - 40 U/L    ALT 21 0 - 45 U/L    Narrative    Fasting Glucose reference range is 70-99 mg/dL per  American Diabetes Association (ADA) guidelines.   HIV Antigen/Antibody Screening Cascade   Result Value Ref Range    HIV Antigen / Antibody Negative Negative    Narrative    Method is Abbott HIV Ag/Ab for the detection of HIV p24 antigen, HIV-1 antibodies and HIV-2 antibodies.   Microalbumin, Random Urine   Result Value Ref Range    Microalbumin, Random Urine <0.50 0.00 - 1.99 mg/dL    Creatinine, Urine 44.3 mg/dL    Microalbumin/Creatinine Ratio Random Urine        Comment:      \"Unable to calculate: Creatinine and/or Microalbumin value below detectable level\"    Narrative    Microalbumin, Random Urine  <2.0 mg/dL . . . . . . . . Normal  3.0-30.0 mg/dL . . . . . . Microalbuminuria  >30.0 mg/dL . . . " . . .  . Clinical Proteinuria    Microalbumin/Creatinine Ratio, Random Urine  <20 mg/g . . . . .. . . . Normal   mg/g . . . . . . . Microalbuminuria  >300 mg/g . . . . . . . . Clinical Proteinuria       Thyroid Cascade   Result Value Ref Range    TSH 1.98 0.30 - 5.00 uIU/mL   Lipid Cascade   Result Value Ref Range    Cholesterol 226 (H) <=199 mg/dL    Triglycerides 257 (H) <=149 mg/dL    HDL Cholesterol 51 >=50 mg/dL    LDL Calculated 124 <=129 mg/dL    Patient Fasting > 8hrs? No        Vargas,  Your cholesterol still elevated, are you taking your medication every day?  If yes we may need to go up with the dose.  Your blood sugar average is still on the high side, continue to work on healthy lifestyle changes, with  Dr. Hull    Please call with questions or contact us using [employer].    Sincerely,        Electronically signed by Carlee Hull MD

## 2021-06-26 ENCOUNTER — HEALTH MAINTENANCE LETTER (OUTPATIENT)
Age: 71
End: 2021-06-26

## 2021-06-26 NOTE — PROGRESS NOTES
"Progress Notes by Carlee Hull MD at 10/15/2018  2:49 PM     Author: Carlee Hull MD Service: -- Author Type: Physician    Filed: 10/15/2018  5:23 PM Encounter Date: 10/15/2018 Status: Signed    : Carlee Hull MD (Physician)         Assessment and Plan:     1. Encounter for general adult medical examination with abnormal findings    2. Essential hypertension  She was instructed to continue current management  - Comprehensive Metabolic Panel; Future  - Urinalysis; Future  - Comprehensive Metabolic Panel  - Urinalysis    3. Hypothyroidism  She will continue current management, and adjust medication accordingly.  - Thyroid Stimulating Hormone (TSH); Future  - T4, Free; Future  - T3, Total; Future    4. Overweight (BMI 25.0-29.9)  With A1c slowly climbing, discussed about healthy lifestyle changes, weight loss program.  - Glycosylated Hemoglobin A1c; Future  - Comprehensive Metabolic Panel; Future    5. Screening for endocrine, nutritional, metabolic and immunity disorder  - Lipid [place] FASTING    6. Right shoulder pain,DJD of right shoulder  Right shoulder x-ray independently reviewed did show mild degenerative joint disease, no fracture, symptomatic care discussed, she was referred for physical therapy.  - XR Shoulder Right 2 or More VWS; Future    7. Screen for colon cancer  She declined colonoscopy, and after only after long discussion with the patient and  she get a verbal agreement to receive the [place] guard test, order will be signed.  - Cologuard    8. Right groin pain  She was told in [place] where an x-ray was done that she has \"inflammation\", she was given a Chinese medication(she does not know the name) with improvement currently doing hot pack and activity modification, with improvement.  Referral to PT  - Ambulatory referral to PT/OT      health maintenance visit included colonoscopy was offered but she declined it, only after a long discussion with " "the patient and  was able to convince her to get a [place] guard test.  For immunization she declined everything included flu shot, and again after long discussion she was able to agree to get a pneumonia 13      The patient's current medical problems were reviewed.    I have had an Advance Directives discussion with the patient.  The following health maintenance schedule was reviewed with the patient and provided in printed form in the after visit summary:   Health Maintenance   Topic Date Due   ? COLOGUARD  07/28/2001   ? ZOSTER VACCINE  07/28/2011   ? DXA SCAN  07/28/2016   ? PNEUMOCOCCAL POLYSACCHARIDE VACCINE AGE 65 AND OVER  07/28/2016   ? PNEUMOCOCCAL CONJUGATE VACCINE FOR ADULTS (PCV13 OR PREVNAR)  07/28/2016   ? MAMMOGRAM  08/25/2017   ? INFLUENZA VACCINE RULE BASED (1) 08/01/2018   ? FALL RISK ASSESSMENT  10/15/2019   ? TD 18+ HE  08/25/2020   ? ADVANCE DIRECTIVES DISCUSSED WITH PATIENT  03/29/2023        Subjective:   Chief Complaint: Xuan Anand is an 68 y.o. female here for an Annual Wellness visit.   HPI: She is here today for a physical, she is accompanied by a Mandarin , she stating that she is taking her blood pressure pill and her thyroid pill.  Has been having right shoulder and right groin pain.  And according to her and x-ray of her right hip was done in [facility] she was told that she has inflammation of her groin area, she was given some type of \"Chinese medication\" that she took and got better.  Shoulder pain has been more persistent over the years, with decreased range of motion.      Review of Systems:  Please see above.  The rest of the review of systems are negative for all systems.    Patient Care Team:  No Primary Care Provider as PCP - General     Patient Active Problem List   Diagnosis   ? Hypothyroidism   ? Vitamin D Deficiency   ? Cerumen Impaction   ? Rhythm Disorder   ? Abnormal Heart Sounds   ? Essential hypertension     Past Medical History:   Diagnosis Date   ? " "Disease of thyroid gland    ? Hypertension       No past surgical history on file.   No family history on file.   Social History     Social History   ? Marital status:      Spouse name: N/A   ? Number of children: N/A   ? Years of education: N/A     Occupational History   ? Not on file.     Social History Main Topics   ? Smoking status: Never Smoker   ? Smokeless tobacco: Never Used   ? Alcohol use No   ? Drug use: No   ? Sexual activity: Not on file     Other Topics Concern   ? Not on file     Social History Narrative      Current Outpatient Prescriptions   Medication Sig Dispense Refill   ? cholecalciferol, vitamin D3, 1,000 unit tablet Take 1,000 Units by mouth daily.     ? hydroCHLOROthiazide (HYDRODIURIL) 25 MG tablet Take 1 tablet (25 mg total) by mouth daily. 90 tablet 1   ? levothyroxine (SYNTHROID, LEVOTHROID) 25 MCG tablet Take 1 tablet (25 mcg total) by mouth Daily at 6:00 am. 90 tablet 1   ? neomycin-polymyxin-hydrocortisone (CORTISPORIN) otic solution Administer 3 drops in the affected ears twice a day for 5 days. 10 mL 0   ? UNABLE TO FIND Med Name: Root herbal (powder)       No current facility-administered medications for this visit.       Objective:   Vital Signs:   Visit Vitals   ? /70 (Patient Site: Right Arm)   ? Pulse 60   ? Temp 98  F (36.7  C) (Oral)   ? Resp 12   ? Ht 5' 2\" (1.575 m)   ? Wt 151 lb (68.5 kg)   ? BMI 27.62 kg/m2        VisionScreening:  No exam data present     PHYSICAL EXAM  Physical Exam   Constitutional: She appears well-developed and well-nourished.   HENT:   Right Ear: External ear normal.   Left Ear: External ear normal.   Nose: Nose normal.   Mouth/Throat: Oropharynx is clear and moist.   Eyes: Conjunctivae and EOM are normal. Pupils are equal, round, and reactive to light. Right eye exhibits no discharge. Left eye exhibits no discharge.   Neck: No thyromegaly present.   Cardiovascular: Normal rate, regular rhythm and normal heart sounds.    No murmur " heard.  Pulmonary/Chest: Effort normal and breath sounds normal.   Abdominal: Soft. Bowel sounds are normal. She exhibits no distension and no mass. There is no tenderness. There is no rebound and no guarding.   Musculoskeletal:        Right shoulder: She exhibits decreased range of motion and tenderness.        Right hip: She exhibits decreased strength.        Arms:       Legs:  Lymphadenopathy:     She has no cervical adenopathy.   Neurological: She is alert. She has normal reflexes.   Skin: Skin is warm and dry. No rash noted.   Psychiatric: She has a normal mood and affect.       Assessment Results 10/15/2018   Activities of Daily Living No help needed   Instrumental Activities of Daily Living No help needed   Some recent data might be hidden     A Mini-Cog score of 0-2 suggests the possibility of dementia, score of 3-5 suggests no dementia    Identified Health Risks:     The patient was provided with suggestions to help her develop a healthy lifestyle.   She is at risk for lack of exercise and has been provided with information to increase physical activity for the benefit of her well-being.  Information regarding advance directives (living hernandez), including where she can download the appropriate form, was provided to the patient via the AVS.

## 2021-10-16 ENCOUNTER — HEALTH MAINTENANCE LETTER (OUTPATIENT)
Age: 71
End: 2021-10-16

## 2021-10-21 DIAGNOSIS — I10 ESSENTIAL HYPERTENSION: ICD-10-CM

## 2021-10-21 RX ORDER — HYDROCHLOROTHIAZIDE 25 MG/1
TABLET ORAL
Qty: 30 TABLET | Refills: 0 | Status: SHIPPED | OUTPATIENT
Start: 2021-10-21 | End: 2021-11-22

## 2021-10-22 NOTE — TELEPHONE ENCOUNTER
"Last Written Prescription Date:  1/24/2021  Last Fill Quantity: 90,  # refills: 2   Last office visit provider:  11/18/2020     Requested Prescriptions   Pending Prescriptions Disp Refills     hydrochlorothiazide (HYDRODIURIL) 25 MG tablet [Pharmacy Med Name: HYDROCHLOROTHIAZIDE 25MG TABLETS] 90 tablet 2     Sig: TAKE 1 TABLET(25 MG) BY MOUTH DAILY       Diuretics (Including Combos) Protocol Passed - 10/21/2021  7:53 AM        Passed - Blood pressure under 140/90 in past 12 months     BP Readings from Last 3 Encounters:   11/18/20 133/69   01/21/20 (!) 150/85                 Passed - Recent (12 mo) or future (30 days) visit within the authorizing provider's specialty     Patient has had an office visit with the authorizing provider or a provider within the authorizing providers department within the previous 12 mos or has a future within next 30 days. See \"Patient Info\" tab in inbasket, or \"Choose Columns\" in Meds & Orders section of the refill encounter.              Passed - Medication is active on med list        Passed - Patient is age 18 or older        Passed - No active pregancy on record        Passed - Normal serum creatinine on file in past 12 months     Recent Labs   Lab Test 11/18/20  1628   CR 0.76              Passed - Normal serum potassium on file in past 12 months     Recent Labs   Lab Test 11/18/20  1628   POTASSIUM 3.9                    Passed - Normal serum sodium on file in past 12 months     Recent Labs   Lab Test 11/18/20  1628                 Passed - No positive pregnancy test in past 12 months             Agueda Munoz RN 10/21/21 10:57 PM  "

## 2021-11-19 DIAGNOSIS — I10 ESSENTIAL HYPERTENSION: ICD-10-CM

## 2021-11-22 RX ORDER — HYDROCHLOROTHIAZIDE 25 MG/1
TABLET ORAL
Qty: 30 TABLET | Refills: 0 | Status: SHIPPED | OUTPATIENT
Start: 2021-11-22 | End: 2022-02-01

## 2021-11-22 NOTE — TELEPHONE ENCOUNTER
"Routing refill request to provider for review/approval because:  Labs out of range:  Failed blood pressure  Labs not current:  Labs 11/18/20  Patient needs to be seen because it has been more than 1 year since last office visit.    Last Written Prescription Date:  10/21/21  Last Fill Quantity: 30,  # refills: 0   Last office visit provider:  11/18/20     Requested Prescriptions   Pending Prescriptions Disp Refills     hydrochlorothiazide (HYDRODIURIL) 25 MG tablet [Pharmacy Med Name: HYDROCHLOROTHIAZIDE 25MG TABLETS] 30 tablet 0     Sig: TAKE 1 TABLET(25 MG) BY MOUTH DAILY       Diuretics (Including Combos) Protocol Failed - 11/19/2021  9:21 AM        Failed - Blood pressure under 140/90 in past 12 months     BP Readings from Last 3 Encounters:   11/18/20 133/69   01/21/20 (!) 150/85                 Failed - Recent (12 mo) or future (30 days) visit within the authorizing provider's specialty     Patient has had an office visit with the authorizing provider or a provider within the authorizing providers department within the previous 12 mos or has a future within next 30 days. See \"Patient Info\" tab in inbasket, or \"Choose Columns\" in Meds & Orders section of the refill encounter.              Failed - Normal serum creatinine on file in past 12 months     Recent Labs   Lab Test 11/18/20  1628   CR 0.76              Failed - Normal serum potassium on file in past 12 months     Recent Labs   Lab Test 11/18/20  1628   POTASSIUM 3.9                    Failed - Normal serum sodium on file in past 12 months     Recent Labs   Lab Test 11/18/20  1628                 Passed - Medication is active on med list        Passed - Patient is age 18 or older        Passed - No active pregancy on record        Passed - No positive pregnancy test in past 12 months             Lynne Capps RN 11/22/21 3:14 PM  "

## 2021-12-27 DIAGNOSIS — I10 ESSENTIAL HYPERTENSION: ICD-10-CM

## 2021-12-29 NOTE — TELEPHONE ENCOUNTER
"Routing refill request to provider for review/approval because:  Labs not current:  Cr, K+, Na, BP  Patient needs to be seen because it has been more than 1 year since last office visit.    Last Written Prescription Date:  11/22/21  Last Fill Quantity: 30,  # refills: 0   Last office visit provider:  11/15/20     Requested Prescriptions   Pending Prescriptions Disp Refills     hydrochlorothiazide (HYDRODIURIL) 25 MG tablet 30 tablet 0     Sig: Take 1 tablet (25 mg) by mouth daily       Diuretics (Including Combos) Protocol Failed - 12/27/2021  4:14 PM        Failed - Blood pressure under 140/90 in past 12 months     BP Readings from Last 3 Encounters:   11/18/20 133/69   01/21/20 (!) 150/85                 Failed - Recent (12 mo) or future (30 days) visit within the authorizing provider's specialty     Patient has had an office visit with the authorizing provider or a provider within the authorizing providers department within the previous 12 mos or has a future within next 30 days. See \"Patient Info\" tab in inbasket, or \"Choose Columns\" in Meds & Orders section of the refill encounter.              Failed - Normal serum creatinine on file in past 12 months     Recent Labs   Lab Test 11/18/20  1628   CR 0.76              Failed - Normal serum potassium on file in past 12 months     Recent Labs   Lab Test 11/18/20  1628   POTASSIUM 3.9                    Failed - Normal serum sodium on file in past 12 months     Recent Labs   Lab Test 11/18/20  1628                 Passed - Medication is active on med list        Passed - Patient is age 18 or older        Passed - No active pregancy on record        Passed - No positive pregnancy test in past 12 months             hollis castillo RN 12/29/21 3:11 PM  "

## 2021-12-30 RX ORDER — HYDROCHLOROTHIAZIDE 25 MG/1
25 TABLET ORAL DAILY
Qty: 30 TABLET | Refills: 0 | OUTPATIENT
Start: 2021-12-30

## 2021-12-31 NOTE — TELEPHONE ENCOUNTER
Left message #1 at [phone] use LL. Postponing task out to a week and will try again. If patient returns call back, please help patient schedule an appointment per message below. Thanks!

## 2022-01-10 NOTE — TELEPHONE ENCOUNTER
Left message #2 at [phone] . Sending letter out and postponing task out to 2 weeks and will try again if an appointment hasn't been made. If patient returns call back, please help patient schedule an appointment per message below. Thanks!

## 2022-01-23 DIAGNOSIS — E03.8 OTHER SPECIFIED HYPOTHYROIDISM: ICD-10-CM

## 2022-01-25 RX ORDER — LEVOTHYROXINE SODIUM 25 UG/1
TABLET ORAL
Qty: 90 TABLET | Refills: 3 | Status: SHIPPED | OUTPATIENT
Start: 2022-01-25

## 2022-01-25 NOTE — TELEPHONE ENCOUNTER
"Routing refill request to provider for review/approval because:  Labs not current:  TSH last checked 11/18/2020.    Last Written Prescription Date:  1/21/2021  Last Fill Quantity: 90,  # refills: 3   Last office visit provider:  11/18/2020     Requested Prescriptions   Pending Prescriptions Disp Refills     levothyroxine (SYNTHROID/LEVOTHROID) 25 MCG tablet [Pharmacy Med Name: LEVOTHYROXINE 0.025MG (25MCG) TAB] 90 tablet 3     Sig: TAKE 1 TABLET BY MOUTH EVERY DAY AT 6AM       Thyroid Protocol Failed - 1/23/2022  3:39 PM        Failed - Recent (12 mo) or future (30 days) visit within the authorizing provider's specialty     Patient has had an office visit with the authorizing provider or a provider within the authorizing providers department within the previous 12 mos or has a future within next 30 days. See \"Patient Info\" tab in inbasket, or \"Choose Columns\" in Meds & Orders section of the refill encounter.              Failed - Normal TSH on file in past 12 months     Recent Labs   Lab Test 11/18/20  1628   TSH 1.98              Passed - Patient is 12 years or older        Passed - Medication is active on med list        Passed - No active pregnancy on record     If patient is pregnant or has had a positive pregnancy test, please check TSH.          Passed - No positive pregnancy test in past 12 months     If patient is pregnant or has had a positive pregnancy test, please check TSH.               Raisa Kaye RN 01/25/22 2:01 PM  "

## 2022-01-26 NOTE — TELEPHONE ENCOUNTER
Left message #3 at [phone]. If patient returns call back, please help patient schedule an appointment per message below. Thanks! We have made several attempts to contact patient by phone and letter to schedule an appointment. Unfortunately, our calls have not been returned and we were unable to schedule. At this time, we will no longer make an attempt to schedule this appointment. Completing task.

## 2022-02-01 DIAGNOSIS — I10 ESSENTIAL HYPERTENSION: ICD-10-CM

## 2022-02-03 RX ORDER — HYDROCHLOROTHIAZIDE 25 MG/1
25 TABLET ORAL DAILY
Qty: 30 TABLET | Refills: 0 | Status: SHIPPED | OUTPATIENT
Start: 2022-02-03 | End: 2022-03-09

## 2022-02-03 NOTE — TELEPHONE ENCOUNTER
"Routing refill request to provider for review/approval because:  Labs not current:  Multiple  BP not current  Patient needs to be seen because it has been more than 1 year since last office visit.    Last Written Prescription Date:  11/22/21  Last Fill Quantity: 30,  # refills: 0   Last office visit provider:  11/18/20     Requested Prescriptions   Pending Prescriptions Disp Refills     hydrochlorothiazide (HYDRODIURIL) 25 MG tablet 30 tablet 0     Sig: Take 1 tablet (25 mg) by mouth daily       Diuretics (Including Combos) Protocol Failed - 2/3/2022  1:24 PM        Failed - Blood pressure under 140/90 in past 12 months     BP Readings from Last 3 Encounters:   11/18/20 133/69   01/21/20 (!) 150/85                 Failed - Recent (12 mo) or future (30 days) visit within the authorizing provider's specialty     Patient has had an office visit with the authorizing provider or a provider within the authorizing providers department within the previous 12 mos or has a future within next 30 days. See \"Patient Info\" tab in inbasket, or \"Choose Columns\" in Meds & Orders section of the refill encounter.              Failed - Normal serum creatinine on file in past 12 months     Recent Labs   Lab Test 11/18/20  1628   CR 0.76              Failed - Normal serum potassium on file in past 12 months     Recent Labs   Lab Test 11/18/20  1628   POTASSIUM 3.9                    Failed - Normal serum sodium on file in past 12 months     Recent Labs   Lab Test 11/18/20  1628                 Passed - Medication is active on med list        Passed - Patient is age 18 or older        Passed - No active pregancy on record        Passed - No positive pregnancy test in past 12 months             Mamadou Suarez RN 02/03/22 1:39 PM  "

## 2022-02-05 ENCOUNTER — HEALTH MAINTENANCE LETTER (OUTPATIENT)
Age: 72
End: 2022-02-05

## 2022-03-08 DIAGNOSIS — I10 ESSENTIAL HYPERTENSION: ICD-10-CM

## 2022-03-09 RX ORDER — HYDROCHLOROTHIAZIDE 25 MG/1
25 TABLET ORAL DAILY
Qty: 30 TABLET | Refills: 0 | Status: SHIPPED | OUTPATIENT
Start: 2022-03-09 | End: 2022-04-14

## 2022-03-09 NOTE — TELEPHONE ENCOUNTER
"Routing refill request to provider for review/approval because:  Labs not current:  Multiple  Patient needs to be seen because it has been more than 1 year since last office visit.  BP not current    Last Written Prescription Date:  2/3/22  Last Fill Quantity: 30,  # refills: 0   Last office visit provider:  11/18/20     Requested Prescriptions   Pending Prescriptions Disp Refills     hydrochlorothiazide (HYDRODIURIL) 25 MG tablet 30 tablet 0     Sig: Take 1 tablet (25 mg) by mouth daily       Diuretics (Including Combos) Protocol Failed - 3/9/2022 11:34 AM        Failed - Blood pressure under 140/90 in past 12 months     BP Readings from Last 3 Encounters:   11/18/20 133/69   01/21/20 (!) 150/85                 Failed - Recent (12 mo) or future (30 days) visit within the authorizing provider's specialty     Patient has had an office visit with the authorizing provider or a provider within the authorizing providers department within the previous 12 mos or has a future within next 30 days. See \"Patient Info\" tab in inbasket, or \"Choose Columns\" in Meds & Orders section of the refill encounter.              Failed - Normal serum creatinine on file in past 12 months     Recent Labs   Lab Test 11/18/20  1628   CR 0.76              Failed - Normal serum potassium on file in past 12 months     Recent Labs   Lab Test 11/18/20  1628   POTASSIUM 3.9                    Failed - Normal serum sodium on file in past 12 months     Recent Labs   Lab Test 11/18/20  1628                 Passed - Medication is active on med list        Passed - Patient is age 18 or older        Passed - No active pregancy on record        Passed - No positive pregnancy test in past 12 months             Mamadou Suarez RN 03/09/22 11:34 AM  "

## 2022-04-11 DIAGNOSIS — I10 ESSENTIAL HYPERTENSION: ICD-10-CM

## 2022-04-14 RX ORDER — HYDROCHLOROTHIAZIDE 25 MG/1
25 TABLET ORAL DAILY
Qty: 20 TABLET | Refills: 0 | Status: SHIPPED | OUTPATIENT
Start: 2022-04-14

## 2022-04-14 NOTE — TELEPHONE ENCOUNTER
"Routing refill request to provider for review/approval because:  Labs not current:  CR, K, NA  Patient needs to be seen because it has been more than 1 year since last office visit.  Blood pressue.     Last Written Prescription Date:  3/9/2022  Last Fill Quantity: 30,  # refills: 0   Last office visit provider:  11/18/2020     Requested Prescriptions   Pending Prescriptions Disp Refills     hydrochlorothiazide (HYDRODIURIL) 25 MG tablet 30 tablet 0     Sig: Take 1 tablet (25 mg) by mouth daily       Diuretics (Including Combos) Protocol Failed - 4/14/2022 11:52 AM        Failed - Blood pressure under 140/90 in past 12 months     BP Readings from Last 3 Encounters:   11/18/20 133/69   01/21/20 (!) 150/85                 Failed - Recent (12 mo) or future (30 days) visit within the authorizing provider's specialty     Patient has had an office visit with the authorizing provider or a provider within the authorizing providers department within the previous 12 mos or has a future within next 30 days. See \"Patient Info\" tab in inbasket, or \"Choose Columns\" in Meds & Orders section of the refill encounter.              Failed - Normal serum creatinine on file in past 12 months     Recent Labs   Lab Test 11/18/20  1628   CR 0.76              Failed - Normal serum potassium on file in past 12 months     Recent Labs   Lab Test 11/18/20  1628   POTASSIUM 3.9                    Failed - Normal serum sodium on file in past 12 months     Recent Labs   Lab Test 11/18/20  1628                 Passed - Medication is active on med list        Passed - Patient is age 18 or older        Passed - No active pregancy on record        Passed - No positive pregnancy test in past 12 months             Abbie Diaz RN 04/14/22 11:53 AM  "

## 2022-10-01 ENCOUNTER — HEALTH MAINTENANCE LETTER (OUTPATIENT)
Age: 72
End: 2022-10-01

## 2023-05-14 ENCOUNTER — HEALTH MAINTENANCE LETTER (OUTPATIENT)
Age: 73
End: 2023-05-14

## 2023-08-06 ENCOUNTER — HEALTH MAINTENANCE LETTER (OUTPATIENT)
Age: 73
End: 2023-08-06